# Patient Record
Sex: MALE | Race: WHITE | Employment: FULL TIME | URBAN - METROPOLITAN AREA
[De-identification: names, ages, dates, MRNs, and addresses within clinical notes are randomized per-mention and may not be internally consistent; named-entity substitution may affect disease eponyms.]

---

## 2019-09-18 ENCOUNTER — OFFICE VISIT (OUTPATIENT)
Dept: FAMILY MEDICINE CLINIC | Age: 54
End: 2019-09-18
Payer: COMMERCIAL

## 2019-09-18 VITALS
DIASTOLIC BLOOD PRESSURE: 100 MMHG | WEIGHT: 194.4 LBS | HEART RATE: 86 BPM | OXYGEN SATURATION: 98 % | TEMPERATURE: 98.9 F | SYSTOLIC BLOOD PRESSURE: 170 MMHG

## 2019-09-18 DIAGNOSIS — I10 ESSENTIAL HYPERTENSION: Primary | ICD-10-CM

## 2019-09-18 DIAGNOSIS — Z13.29 SCREENING FOR THYROID DISORDER: ICD-10-CM

## 2019-09-18 DIAGNOSIS — Z13.0 SCREENING, ANEMIA, DEFICIENCY, IRON: ICD-10-CM

## 2019-09-18 PROCEDURE — 99204 OFFICE O/P NEW MOD 45 MIN: CPT | Performed by: NURSE PRACTITIONER

## 2019-09-18 RX ORDER — LISINOPRIL 5 MG/1
5 TABLET ORAL DAILY
Qty: 30 TABLET | Refills: 1 | Status: SHIPPED | OUTPATIENT
Start: 2019-09-18 | End: 2019-10-22 | Stop reason: ALTCHOICE

## 2019-09-18 SDOH — HEALTH STABILITY: MENTAL HEALTH: HOW OFTEN DO YOU HAVE A DRINK CONTAINING ALCOHOL?: MONTHLY OR LESS

## 2019-09-18 ASSESSMENT — ENCOUNTER SYMPTOMS
ABDOMINAL PAIN: 0
EYES NEGATIVE: 1
CONSTIPATION: 0
WHEEZING: 0
RHINORRHEA: 0
RECTAL PAIN: 0
DIARRHEA: 0
BACK PAIN: 0
SORE THROAT: 0
SHORTNESS OF BREATH: 0
COUGH: 0
ABDOMINAL DISTENTION: 0

## 2019-09-18 ASSESSMENT — PATIENT HEALTH QUESTIONNAIRE - PHQ9
SUM OF ALL RESPONSES TO PHQ9 QUESTIONS 1 & 2: 0
SUM OF ALL RESPONSES TO PHQ QUESTIONS 1-9: 0
1. LITTLE INTEREST OR PLEASURE IN DOING THINGS: 0
2. FEELING DOWN, DEPRESSED OR HOPELESS: 0
SUM OF ALL RESPONSES TO PHQ QUESTIONS 1-9: 0

## 2019-09-18 NOTE — PATIENT INSTRUCTIONS
SURVEY:    You may be receiving a survey from [employer] regarding your visit today. Please complete the survey to enable us to provide the highest quality of care to you and your family. If you cannot score us as very good on any question, please call the office to discuss how we could have made your experience exceptional.     Thank you. Patient Education        High Blood Pressure: Care Instructions  Overview    It's normal for blood pressure to go up and down throughout the day. But if it stays up, you have high blood pressure. Another name for high blood pressure is hypertension. Despite what a lot of people think, high blood pressure usually doesn't cause headaches or make you feel dizzy or lightheaded. It usually has no symptoms. But it does increase your risk of stroke, heart attack, and other problems. You and your doctor will talk about your risks of these problems based on your blood pressure. Your doctor will give you a goal for your blood pressure. Your goal will be based on your health and your age. Lifestyle changes, such as eating healthy and being active, are always important to help lower blood pressure. You might also take medicine to reach your blood pressure goal.  Follow-up care is a key part of your treatment and safety. Be sure to make and go to all appointments, and call your doctor if you are having problems. It's also a good idea to know your test results and keep a list of the medicines you take. How can you care for yourself at home? Medical treatment  · If you stop taking your medicine, your blood pressure will go back up. You may take one or more types of medicine to lower your blood pressure. Be safe with medicines. Take your medicine exactly as prescribed. Call your doctor if you think you are having a problem with your medicine. · Talk to your doctor before you start taking aspirin every day.  Aspirin can help certain people lower their risk of a heart attack or

## 2019-09-19 ENCOUNTER — HOSPITAL ENCOUNTER (OUTPATIENT)
Age: 54
Discharge: HOME | End: 2019-09-19
Payer: COMMERCIAL

## 2019-09-19 DIAGNOSIS — I10 ESSENTIAL HYPERTENSION: ICD-10-CM

## 2019-09-19 DIAGNOSIS — Z13.29 SCREENING FOR THYROID DISORDER: ICD-10-CM

## 2019-09-19 DIAGNOSIS — Z13.0 SCREENING, ANEMIA, DEFICIENCY, IRON: ICD-10-CM

## 2019-09-19 LAB
ABSOLUTE EOS #: 0.1 K/UL (ref 0–0.4)
ABSOLUTE IMMATURE GRANULOCYTE: NORMAL K/UL (ref 0–0.3)
ABSOLUTE LYMPH #: 1.6 K/UL (ref 1–4.8)
ABSOLUTE MONO #: 0.3 K/UL (ref 0–1)
ANION GAP SERPL CALCULATED.3IONS-SCNC: 11 MMOL/L (ref 9–17)
BASOPHILS # BLD: 0 % (ref 0–2)
BASOPHILS ABSOLUTE: 0 K/UL (ref 0–0.2)
BUN BLDV-MCNC: 21 MG/DL (ref 6–20)
BUN/CREAT BLD: 19 (ref 9–20)
CALCIUM SERPL-MCNC: 9.7 MG/DL (ref 8.6–10.4)
CHLORIDE BLD-SCNC: 102 MMOL/L (ref 98–107)
CO2: 26 MMOL/L (ref 20–31)
CREAT SERPL-MCNC: 1.11 MG/DL (ref 0.7–1.2)
DIFFERENTIAL TYPE: YES
EOSINOPHILS RELATIVE PERCENT: 1 % (ref 0–5)
GFR AFRICAN AMERICAN: >60 ML/MIN
GFR NON-AFRICAN AMERICAN: >60 ML/MIN
GFR SERPL CREATININE-BSD FRML MDRD: ABNORMAL ML/MIN/{1.73_M2}
GFR SERPL CREATININE-BSD FRML MDRD: ABNORMAL ML/MIN/{1.73_M2}
GLUCOSE BLD-MCNC: 102 MG/DL (ref 70–99)
HCT VFR BLD CALC: 44.5 % (ref 41–53)
HEMOGLOBIN: 15.1 G/DL (ref 13.5–17.5)
IMMATURE GRANULOCYTES: NORMAL %
LYMPHOCYTES # BLD: 27 % (ref 13–44)
MCH RBC QN AUTO: 31.4 PG (ref 26–34)
MCHC RBC AUTO-ENTMCNC: 33.8 G/DL (ref 31–37)
MCV RBC AUTO: 92.9 FL (ref 80–100)
MONOCYTES # BLD: 6 % (ref 5–9)
NRBC AUTOMATED: NORMAL PER 100 WBC
PDW BLD-RTO: 13.7 % (ref 12.1–15.2)
PLATELET # BLD: 151 K/UL (ref 140–450)
PLATELET ESTIMATE: NORMAL
PMV BLD AUTO: NORMAL FL (ref 6–12)
POTASSIUM SERPL-SCNC: 3.7 MMOL/L (ref 3.7–5.3)
RBC # BLD: 4.79 M/UL (ref 4.5–5.9)
RBC # BLD: NORMAL 10*6/UL
SEG NEUTROPHILS: 66 % (ref 39–75)
SEGMENTED NEUTROPHILS ABSOLUTE COUNT: 4.1 K/UL (ref 2.1–6.5)
SODIUM BLD-SCNC: 139 MMOL/L (ref 135–144)
TSH SERPL DL<=0.05 MIU/L-ACNC: 1.74 MIU/L (ref 0.3–5)
WBC # BLD: 6.2 K/UL (ref 3.5–11)
WBC # BLD: NORMAL 10*3/UL

## 2019-09-19 PROCEDURE — 80048 BASIC METABOLIC PNL TOTAL CA: CPT

## 2019-09-19 PROCEDURE — 84443 ASSAY THYROID STIM HORMONE: CPT

## 2019-09-19 PROCEDURE — 85025 COMPLETE CBC W/AUTO DIFF WBC: CPT

## 2019-09-19 PROCEDURE — 36415 COLL VENOUS BLD VENIPUNCTURE: CPT

## 2019-09-19 PROCEDURE — 93005 ELECTROCARDIOGRAM TRACING: CPT

## 2019-09-20 LAB
EKG ATRIAL RATE: 80 BPM
EKG P AXIS: 9 DEGREES
EKG P-R INTERVAL: 142 MS
EKG Q-T INTERVAL: 404 MS
EKG QRS DURATION: 110 MS
EKG QTC CALCULATION (BAZETT): 465 MS
EKG R AXIS: 21 DEGREES
EKG T AXIS: 37 DEGREES
EKG VENTRICULAR RATE: 80 BPM

## 2019-09-20 PROCEDURE — 93010 ELECTROCARDIOGRAM REPORT: CPT | Performed by: INTERNAL MEDICINE

## 2019-10-22 ENCOUNTER — TELEPHONE (OUTPATIENT)
Dept: PRIMARY CARE CLINIC | Age: 54
End: 2019-10-22

## 2019-10-22 ENCOUNTER — OFFICE VISIT (OUTPATIENT)
Dept: PRIMARY CARE CLINIC | Age: 54
End: 2019-10-22
Payer: COMMERCIAL

## 2019-10-22 VITALS
HEIGHT: 72 IN | BODY MASS INDEX: 26.01 KG/M2 | SYSTOLIC BLOOD PRESSURE: 142 MMHG | WEIGHT: 192 LBS | OXYGEN SATURATION: 97 % | DIASTOLIC BLOOD PRESSURE: 88 MMHG | HEART RATE: 84 BPM | TEMPERATURE: 98.3 F

## 2019-10-22 DIAGNOSIS — Z12.5 SCREENING FOR PROSTATE CANCER: ICD-10-CM

## 2019-10-22 DIAGNOSIS — Z13.220 SCREENING CHOLESTEROL LEVEL: ICD-10-CM

## 2019-10-22 DIAGNOSIS — Z12.11 ENCOUNTER FOR SCREENING COLONOSCOPY: ICD-10-CM

## 2019-10-22 DIAGNOSIS — Z00.00 ANNUAL PHYSICAL EXAM: Primary | ICD-10-CM

## 2019-10-22 DIAGNOSIS — Z12.11 COLON CANCER SCREENING: Primary | ICD-10-CM

## 2019-10-22 DIAGNOSIS — I10 ESSENTIAL HYPERTENSION: ICD-10-CM

## 2019-10-22 PROCEDURE — 99396 PREV VISIT EST AGE 40-64: CPT | Performed by: NURSE PRACTITIONER

## 2019-10-22 RX ORDER — AMLODIPINE BESYLATE 5 MG/1
5 TABLET ORAL DAILY
Qty: 30 TABLET | Refills: 1 | Status: SHIPPED | OUTPATIENT
Start: 2019-10-22 | End: 2019-11-19 | Stop reason: SDUPTHER

## 2019-10-22 ASSESSMENT — ENCOUNTER SYMPTOMS
CONSTIPATION: 0
EYES NEGATIVE: 1
ABDOMINAL DISTENTION: 0
COUGH: 0
DIARRHEA: 0
SINUS PAIN: 0

## 2019-10-23 RX ORDER — SODIUM, POTASSIUM,MAG SULFATES 17.5-3.13G
SOLUTION, RECONSTITUTED, ORAL ORAL
Qty: 2 BOTTLE | Refills: 0 | Status: SHIPPED | OUTPATIENT
Start: 2019-10-23 | End: 2020-10-21 | Stop reason: ALTCHOICE

## 2019-11-06 ENCOUNTER — TELEPHONE (OUTPATIENT)
Dept: PRIMARY CARE CLINIC | Age: 54
End: 2019-11-06

## 2019-12-05 ENCOUNTER — HOSPITAL ENCOUNTER (OUTPATIENT)
Age: 54
Discharge: HOME | End: 2019-12-05
Payer: COMMERCIAL

## 2019-12-05 DIAGNOSIS — I10 ESSENTIAL HYPERTENSION: ICD-10-CM

## 2019-12-05 DIAGNOSIS — Z13.220 SCREENING CHOLESTEROL LEVEL: ICD-10-CM

## 2019-12-05 DIAGNOSIS — Z12.5 SCREENING FOR PROSTATE CANCER: ICD-10-CM

## 2019-12-05 DIAGNOSIS — Z00.00 ANNUAL PHYSICAL EXAM: ICD-10-CM

## 2019-12-05 LAB
ALBUMIN SERPL-MCNC: 4.6 G/DL (ref 3.5–5.2)
ALBUMIN/GLOBULIN RATIO: ABNORMAL (ref 1–2.5)
ALP BLD-CCNC: 86 U/L (ref 40–129)
ALT SERPL-CCNC: 30 U/L (ref 5–41)
ANION GAP SERPL CALCULATED.3IONS-SCNC: 11 MMOL/L (ref 9–17)
AST SERPL-CCNC: 21 U/L
BILIRUB SERPL-MCNC: 1.05 MG/DL (ref 0.3–1.2)
BUN BLDV-MCNC: 21 MG/DL (ref 6–20)
BUN/CREAT BLD: 18 (ref 9–20)
CALCIUM SERPL-MCNC: 10.1 MG/DL (ref 8.6–10.4)
CHLORIDE BLD-SCNC: 104 MMOL/L (ref 98–107)
CHOLESTEROL/HDL RATIO: 4
CHOLESTEROL: 253 MG/DL
CO2: 26 MMOL/L (ref 20–31)
CREAT SERPL-MCNC: 1.16 MG/DL (ref 0.7–1.2)
GFR AFRICAN AMERICAN: >60 ML/MIN
GFR NON-AFRICAN AMERICAN: >60 ML/MIN
GFR SERPL CREATININE-BSD FRML MDRD: ABNORMAL ML/MIN/{1.73_M2}
GFR SERPL CREATININE-BSD FRML MDRD: ABNORMAL ML/MIN/{1.73_M2}
GLUCOSE BLD-MCNC: 113 MG/DL (ref 70–99)
HDLC SERPL-MCNC: 63 MG/DL
LDL CHOLESTEROL: 167 MG/DL (ref 0–130)
PATIENT FASTING?: YES
POTASSIUM SERPL-SCNC: 4.2 MMOL/L (ref 3.7–5.3)
PROSTATE SPECIFIC ANTIGEN: 1.63 UG/L
SODIUM BLD-SCNC: 141 MMOL/L (ref 135–144)
TOTAL PROTEIN: 7.8 G/DL (ref 6.4–8.3)
TRIGL SERPL-MCNC: 116 MG/DL
VLDLC SERPL CALC-MCNC: ABNORMAL MG/DL (ref 1–30)

## 2019-12-05 PROCEDURE — 80053 COMPREHEN METABOLIC PANEL: CPT

## 2019-12-05 PROCEDURE — 80061 LIPID PANEL: CPT

## 2019-12-05 PROCEDURE — G0103 PSA SCREENING: HCPCS

## 2019-12-05 PROCEDURE — 36415 COLL VENOUS BLD VENIPUNCTURE: CPT

## 2019-12-06 RX ORDER — ROSUVASTATIN CALCIUM 20 MG/1
20 TABLET, COATED ORAL NIGHTLY
Qty: 90 TABLET | Refills: 0 | Status: SHIPPED | OUTPATIENT
Start: 2019-12-06 | End: 2020-01-08 | Stop reason: SDUPTHER

## 2019-12-24 RX ORDER — AMLODIPINE BESYLATE 5 MG/1
5 TABLET ORAL DAILY
Qty: 90 TABLET | Refills: 2 | Status: SHIPPED | OUTPATIENT
Start: 2019-12-24 | End: 2020-08-28

## 2020-01-07 ENCOUNTER — TELEPHONE (OUTPATIENT)
Dept: FAMILY MEDICINE CLINIC | Age: 55
End: 2020-01-07

## 2020-01-07 DIAGNOSIS — E78.00 HYPERCHOLESTEROLEMIA: Primary | ICD-10-CM

## 2020-01-07 RX ORDER — ROSUVASTATIN CALCIUM 20 MG/1
20 TABLET, COATED ORAL NIGHTLY
Qty: 90 TABLET | Refills: 0 | Status: CANCELLED | OUTPATIENT
Start: 2020-01-07

## 2020-01-08 RX ORDER — ROSUVASTATIN CALCIUM 20 MG/1
20 TABLET, COATED ORAL NIGHTLY
Qty: 90 TABLET | Refills: 0 | Status: SHIPPED | OUTPATIENT
Start: 2020-01-08 | End: 2020-05-05

## 2020-08-28 RX ORDER — AMLODIPINE BESYLATE 5 MG/1
TABLET ORAL
Qty: 90 TABLET | Refills: 0 | Status: SHIPPED | OUTPATIENT
Start: 2020-08-28 | End: 2020-10-21 | Stop reason: SDUPTHER

## 2020-10-21 ENCOUNTER — OFFICE VISIT (OUTPATIENT)
Dept: PRIMARY CARE CLINIC | Age: 55
End: 2020-10-21
Payer: COMMERCIAL

## 2020-10-21 VITALS — WEIGHT: 200 LBS | TEMPERATURE: 98.2 F | OXYGEN SATURATION: 98 % | BODY MASS INDEX: 27.12 KG/M2 | HEART RATE: 93 BPM

## 2020-10-21 PROCEDURE — 99214 OFFICE O/P EST MOD 30 MIN: CPT | Performed by: NURSE PRACTITIONER

## 2020-10-21 RX ORDER — AMLODIPINE BESYLATE 5 MG/1
TABLET ORAL
Qty: 90 TABLET | Refills: 1 | Status: SHIPPED | OUTPATIENT
Start: 2020-10-21 | End: 2021-03-17 | Stop reason: SDUPTHER

## 2020-10-21 RX ORDER — ROSUVASTATIN CALCIUM 20 MG/1
20 TABLET, COATED ORAL NIGHTLY
Qty: 90 TABLET | Refills: 3 | Status: SHIPPED | OUTPATIENT
Start: 2020-10-21 | End: 2021-03-17 | Stop reason: SDUPTHER

## 2020-10-21 ASSESSMENT — ENCOUNTER SYMPTOMS
EYES NEGATIVE: 1
ABDOMINAL DISTENTION: 0
CHEST TIGHTNESS: 0

## 2020-10-21 ASSESSMENT — PATIENT HEALTH QUESTIONNAIRE - PHQ9
SUM OF ALL RESPONSES TO PHQ QUESTIONS 1-9: 0
SUM OF ALL RESPONSES TO PHQ QUESTIONS 1-9: 0
2. FEELING DOWN, DEPRESSED OR HOPELESS: 0
1. LITTLE INTEREST OR PLEASURE IN DOING THINGS: 0
SUM OF ALL RESPONSES TO PHQ9 QUESTIONS 1 & 2: 0
SUM OF ALL RESPONSES TO PHQ QUESTIONS 1-9: 0

## 2020-10-21 NOTE — PATIENT INSTRUCTIONS
You may be receiving a survey from [employer] regarding your visit today. You may get this in the mail, through your MyChart or in your email. Please complete the survey to enable us to provide the highest quality of care to you and your family. If you cannot score us as very good ( 5 Stars) on any question, please feel free to call the office to discuss how we could have made your experience exceptional.     Thank You! Dejah Monteiro, JORGE LUIS-CNP  [address] [place], [place], [place]    Tdap (tetanus) vaccine needs to be updated.

## 2020-10-21 NOTE — PROGRESS NOTES
10/21/2020     Nicolasa Driscoll (:  1965) is a 54 y.o. male, here for evaluation of the following medical concerns:    HPI  Hypertension:  Home blood pressure monitoring: Yes - weekly. He is not adherent to a low sodium diet. Patient denies chest pain, shortness of breath, headache, lightheadedness, blurred vision, peripheral edema and palpitations. Antihypertensive medication side effects: no medication side effects noted. Use of agents associated with hypertension: none. Sodium (mmol/L)   Date Value   2019 141    BUN (mg/dL)   Date Value   2019 21 (H)    Glucose (mg/dL)   Date Value   2019 113 (H)      Potassium (mmol/L)   Date Value   2019 4.2    CREATININE (mg/dL)   Date Value   2019 1.16         Treatment Adherence:   Medication compliance:  compliant all of the time  Diet compliance:  compliant all of the time  Weight trend: stable  Current exercise: no regular exercise  Barriers: none    Wt Readings from Last 3 Encounters:   10/21/20 200 lb (90.7 kg)   10/22/19 192 lb (87.1 kg)   19 194 lb 6.4 oz (88.2 kg)                                           Sodium (mmol/L)   Date Value   2019 141    BUN (mg/dL)   Date Value   2019 21 (H)    Glucose (mg/dL)   Date Value   2019 113 (H)      Potassium (mmol/L)   Date Value   2019 4.2    CREATININE (mg/dL)   Date Value   2019 1.16         Hyperlipidemia:  No new myalgias or GI upset on rosuvastatin (Crestor). Lab Results   Component Value Date    CHOL 253 (H) 2019    TRIG 116 2019    HDL 63 2019     Lab Results   Component Value Date    ALT 30 2019    AST 21 2019          Review of Systems   Constitutional: Negative for activity change, appetite change, chills and fever. HENT: Negative for congestion. Eyes: Negative. Respiratory: Negative for chest tightness.     Cardiovascular: Negative for chest pain and leg swelling. Gastrointestinal: Negative for abdominal distention. Genitourinary: Negative for difficulty urinating. Musculoskeletal: Negative for arthralgias. Neurological: Negative for dizziness and headaches. Psychiatric/Behavioral: Negative for agitation. Prior to Visit Medications    Medication Sig Taking? Authorizing Provider   amLODIPine (NORVASC) 5 MG tablet TAKE 1 TABLET BY MOUTH EVERY DAY Yes JORGE LUIS Engle CNP   rosuvastatin (CRESTOR) 20 MG tablet Take 1 tablet by mouth nightly Yes JORGE LUIS Engle CNP        Social History     Tobacco Use    Smoking status: Never Smoker    Smokeless tobacco: Never Used   Substance Use Topics    Alcohol use: Yes     Frequency: Monthly or less        Vitals:    10/21/20 1618   Pulse: 93   Temp: 98.2 °F (36.8 °C)   TempSrc: Infrared   SpO2: 98%   Weight: 200 lb (90.7 kg)     Estimated body mass index is 27.12 kg/m² as calculated from the following:    Height as of 10/22/19: 6' (1.829 m). Weight as of this encounter: 200 lb (90.7 kg). Physical Exam  Vitals signs and nursing note reviewed. Constitutional:       General: He is not in acute distress. Appearance: Normal appearance. He is well-developed. HENT:      Head: Normocephalic and atraumatic. Right Ear: Tympanic membrane normal.      Left Ear: Tympanic membrane and external ear normal.      Nose: No congestion. Mouth/Throat:      Mouth: Mucous membranes are moist.      Pharynx: No oropharyngeal exudate. Eyes:      Pupils: Pupils are equal, round, and reactive to light. Neck:      Musculoskeletal: Normal range of motion and neck supple. Vascular: No carotid bruit (neg claudette). Cardiovascular:      Rate and Rhythm: Normal rate and regular rhythm. Pulses: Normal pulses. Heart sounds: Normal heart sounds. No murmur. Pulmonary:      Effort: Pulmonary effort is normal. No respiratory distress. Breath sounds: Normal breath sounds.    Abdominal: Palpations: Abdomen is soft. There is no mass. Tenderness: There is no abdominal tenderness. Musculoskeletal: Normal range of motion. Right lower leg: No edema. Left lower leg: No edema. Lymphadenopathy:      Cervical: No cervical adenopathy. Skin:     Findings: No rash. Neurological:      Mental Status: He is alert and oriented to person, place, and time. Psychiatric:         Behavior: Behavior normal.         Thought Content: Thought content normal.         Judgment: Judgment normal.         ASSESSMENT/PLAN:  1. Essential hypertension  Stable and controlled  Continue CCB  Reduce salt in diet  Avoid pop  - amLODIPine (NORVASC) 5 MG tablet; TAKE 1 TABLET BY MOUTH EVERY DAY  Dispense: 90 tablet; Refill: 1    2. Hypercholesterolemia  Continue statin  Lab Results   Component Value Date    LDLCHOLESTEROL 167 (H) 12/05/2019       - Lipid Panel; Future  - Comprehensive Metabolic Panel; Future    3. Prostate cancer screening    - Psa screening; Future      Return in about 6 months (around 4/21/2021) for HTN check. An electronic signature was used to authenticate this note.     --JORGE LUIS Causey - CNP on 10/22/2020 at 7:40 PM

## 2020-10-24 ENCOUNTER — HOSPITAL ENCOUNTER (OUTPATIENT)
Age: 55
Discharge: HOME | End: 2020-10-24
Payer: COMMERCIAL

## 2020-10-24 LAB
ALBUMIN SERPL-MCNC: 4.6 G/DL (ref 3.5–5.2)
ALBUMIN/GLOBULIN RATIO: ABNORMAL (ref 1–2.5)
ALP BLD-CCNC: 75 U/L (ref 40–129)
ALT SERPL-CCNC: 24 U/L (ref 5–41)
ANION GAP SERPL CALCULATED.3IONS-SCNC: 9 MMOL/L (ref 9–17)
AST SERPL-CCNC: 23 U/L
BILIRUB SERPL-MCNC: 0.82 MG/DL (ref 0.3–1.2)
BUN BLDV-MCNC: 21 MG/DL (ref 6–20)
BUN/CREAT BLD: 20 (ref 9–20)
CALCIUM SERPL-MCNC: 9.2 MG/DL (ref 8.6–10.4)
CHLORIDE BLD-SCNC: 105 MMOL/L (ref 98–107)
CHOLESTEROL/HDL RATIO: 2.3
CHOLESTEROL: 126 MG/DL
CO2: 28 MMOL/L (ref 20–31)
CREAT SERPL-MCNC: 1.05 MG/DL (ref 0.7–1.2)
GFR AFRICAN AMERICAN: >60 ML/MIN
GFR NON-AFRICAN AMERICAN: >60 ML/MIN
GFR SERPL CREATININE-BSD FRML MDRD: ABNORMAL ML/MIN/{1.73_M2}
GFR SERPL CREATININE-BSD FRML MDRD: ABNORMAL ML/MIN/{1.73_M2}
GLUCOSE BLD-MCNC: 122 MG/DL (ref 70–99)
HDLC SERPL-MCNC: 54 MG/DL
LDL CHOLESTEROL: 58 MG/DL (ref 0–130)
PATIENT FASTING?: YES
POTASSIUM SERPL-SCNC: 3.8 MMOL/L (ref 3.7–5.3)
PROSTATE SPECIFIC ANTIGEN: 0.64 UG/L
SODIUM BLD-SCNC: 142 MMOL/L (ref 135–144)
TOTAL PROTEIN: 7.3 G/DL (ref 6.4–8.3)
TRIGL SERPL-MCNC: 68 MG/DL
VLDLC SERPL CALC-MCNC: NORMAL MG/DL (ref 1–30)

## 2020-10-24 PROCEDURE — 36415 COLL VENOUS BLD VENIPUNCTURE: CPT

## 2020-10-24 PROCEDURE — 80061 LIPID PANEL: CPT

## 2020-10-24 PROCEDURE — 80053 COMPREHEN METABOLIC PANEL: CPT

## 2020-10-24 PROCEDURE — G0103 PSA SCREENING: HCPCS

## 2021-03-17 DIAGNOSIS — I10 ESSENTIAL HYPERTENSION: ICD-10-CM

## 2021-03-17 RX ORDER — ROSUVASTATIN CALCIUM 20 MG/1
20 TABLET, COATED ORAL NIGHTLY
Qty: 90 TABLET | Refills: 1 | Status: SHIPPED | OUTPATIENT
Start: 2021-03-17 | End: 2021-10-08 | Stop reason: SDUPTHER

## 2021-03-17 RX ORDER — AMLODIPINE BESYLATE 5 MG/1
TABLET ORAL
Qty: 90 TABLET | Refills: 1 | Status: SHIPPED | OUTPATIENT
Start: 2021-03-17 | End: 2021-10-08 | Stop reason: SDUPTHER

## 2021-09-01 ENCOUNTER — TELEPHONE (OUTPATIENT)
Dept: PRIMARY CARE CLINIC | Age: 56
End: 2021-09-01

## 2021-09-01 NOTE — TELEPHONE ENCOUNTER
----- Message from April Shane sent at 9/1/2021  2:18 PM EDT -----  Subject: Appointment Request    Reason for Call: Routine Physical Exam    QUESTIONS  Type of Appointment? Established Patient  Reason for appointment request? Available appointments did not meet   patient need  Additional Information for Provider? patient received call on 8/25 to   switch appointment time for his 10/22 appointment. no available   appointments were found on this date, attempted to reach out to office x2   if they meant for him to switch appointment date entirely or just the   time. please call patient back on his cell phone to reschedule   ---------------------------------------------------------------------------  --------------  [employer]  What is the best way for the office to contact you? OK to leave message on   voicemail  Preferred Call Back Phone Number? [phone]  ---------------------------------------------------------------------------  --------------  SCRIPT ANSWERS  Relationship to Patient? Self  (If the patient has Medicare as their primary insurance coverage ask this   question) Are you requesting a Medicare Annual Wellness Visit? No  (Is the patient requesting a pap smear with their physical exam?)? No  (Is the patient requesting their annual physical and does not need PAP or   AWV per above?)? Yes   Have you been diagnosed with, awaiting test results for, or told that you   are suspected of having COVID-19 (Coronavirus)? (If patient has tested   negative or was tested as a requirement for work, school, or travel and   not based on symptoms, answer no)? No  Do you currently have flu-like symptoms including fever or chills, cough,   shortness of breath, difficulty breathing, or new loss of taste or smell? No  Have you had close contact with someone with COVID-19 in the last 14 days? No  (Service Expert  click yes below to proceed with [employer]   Scheduling)?  Yes

## 2021-09-27 ENCOUNTER — HOSPITAL ENCOUNTER (EMERGENCY)
Age: 56
Discharge: HOME | End: 2021-09-27
Attending: EMERGENCY MEDICINE
Payer: COMMERCIAL

## 2021-09-27 VITALS
HEIGHT: 72 IN | HEART RATE: 98 BPM | BODY MASS INDEX: 25.6 KG/M2 | TEMPERATURE: 98 F | OXYGEN SATURATION: 99 % | DIASTOLIC BLOOD PRESSURE: 91 MMHG | WEIGHT: 189 LBS | RESPIRATION RATE: 16 BRPM | SYSTOLIC BLOOD PRESSURE: 122 MMHG

## 2021-09-27 DIAGNOSIS — U07.1 COVID-19: Primary | ICD-10-CM

## 2021-09-27 DIAGNOSIS — L50.9 URTICARIA: ICD-10-CM

## 2021-09-27 LAB
ABSOLUTE EOS #: 0 K/UL (ref 0–0.4)
ABSOLUTE IMMATURE GRANULOCYTE: ABNORMAL K/UL (ref 0–0.3)
ABSOLUTE LYMPH #: 1.1 K/UL (ref 1–4.8)
ABSOLUTE MONO #: 0.2 K/UL (ref 0–1)
ALBUMIN SERPL-MCNC: 4.1 G/DL (ref 3.5–5.2)
ALBUMIN/GLOBULIN RATIO: ABNORMAL (ref 1–2.5)
ALP BLD-CCNC: 84 U/L (ref 40–129)
ALT SERPL-CCNC: 24 U/L (ref 5–41)
ANION GAP SERPL CALCULATED.3IONS-SCNC: 10 MMOL/L (ref 9–17)
AST SERPL-CCNC: 23 U/L
BASOPHILS # BLD: 1 % (ref 0–2)
BASOPHILS ABSOLUTE: 0 K/UL (ref 0–0.2)
BILIRUB SERPL-MCNC: 0.42 MG/DL (ref 0.3–1.2)
BUN BLDV-MCNC: 22 MG/DL (ref 6–20)
BUN/CREAT BLD: 21 (ref 9–20)
CALCIUM SERPL-MCNC: 8.6 MG/DL (ref 8.6–10.4)
CHLORIDE BLD-SCNC: 104 MMOL/L (ref 98–107)
CO2: 26 MMOL/L (ref 20–31)
CREAT SERPL-MCNC: 1.07 MG/DL (ref 0.7–1.2)
DIFFERENTIAL TYPE: YES
EKG ATRIAL RATE: 94 BPM
EKG P AXIS: 43 DEGREES
EKG P-R INTERVAL: 140 MS
EKG Q-T INTERVAL: 392 MS
EKG QRS DURATION: 108 MS
EKG QTC CALCULATION (BAZETT): 490 MS
EKG R AXIS: 49 DEGREES
EKG T AXIS: 19 DEGREES
EKG VENTRICULAR RATE: 94 BPM
EOSINOPHILS RELATIVE PERCENT: 0 % (ref 0–5)
GFR AFRICAN AMERICAN: >60 ML/MIN
GFR NON-AFRICAN AMERICAN: >60 ML/MIN
GFR SERPL CREATININE-BSD FRML MDRD: ABNORMAL ML/MIN/{1.73_M2}
GFR SERPL CREATININE-BSD FRML MDRD: ABNORMAL ML/MIN/{1.73_M2}
GLUCOSE BLD-MCNC: 147 MG/DL (ref 70–99)
HCT VFR BLD CALC: 46.3 % (ref 41–53)
HEMOGLOBIN: 15.8 G/DL (ref 13.5–17.5)
IMMATURE GRANULOCYTES: ABNORMAL %
LYMPHOCYTES # BLD: 20 % (ref 13–44)
MCH RBC QN AUTO: 31.1 PG (ref 26–34)
MCHC RBC AUTO-ENTMCNC: 34.2 G/DL (ref 31–37)
MCV RBC AUTO: 91 FL (ref 80–100)
MONOCYTES # BLD: 4 % (ref 5–9)
NRBC AUTOMATED: ABNORMAL PER 100 WBC
PDW BLD-RTO: 13.4 % (ref 12.1–15.2)
PLATELET # BLD: 112 K/UL (ref 140–450)
PLATELET ESTIMATE: ABNORMAL
PMV BLD AUTO: ABNORMAL FL (ref 6–12)
POTASSIUM SERPL-SCNC: 3.6 MMOL/L (ref 3.7–5.3)
RBC # BLD: 5.08 M/UL (ref 4.5–5.9)
RBC # BLD: ABNORMAL 10*6/UL
SARS-COV-2, RAPID: DETECTED
SEG NEUTROPHILS: 75 % (ref 39–75)
SEGMENTED NEUTROPHILS ABSOLUTE COUNT: 4 K/UL (ref 2.1–6.5)
SODIUM BLD-SCNC: 140 MMOL/L (ref 135–144)
SPECIMEN DESCRIPTION: ABNORMAL
TOTAL PROTEIN: 6.9 G/DL (ref 6.4–8.3)
TROPONIN INTERP: NORMAL
TROPONIN T: NORMAL NG/ML
TROPONIN, HIGH SENSITIVITY: 7 NG/L (ref 0–22)
WBC # BLD: 5.4 K/UL (ref 3.5–11)
WBC # BLD: ABNORMAL 10*3/UL

## 2021-09-27 PROCEDURE — 36415 COLL VENOUS BLD VENIPUNCTURE: CPT

## 2021-09-27 PROCEDURE — 85025 COMPLETE CBC W/AUTO DIFF WBC: CPT

## 2021-09-27 PROCEDURE — 96375 TX/PRO/DX INJ NEW DRUG ADDON: CPT

## 2021-09-27 PROCEDURE — 84484 ASSAY OF TROPONIN QUANT: CPT

## 2021-09-27 PROCEDURE — 6360000002 HC RX W HCPCS: Performed by: EMERGENCY MEDICINE

## 2021-09-27 PROCEDURE — 80053 COMPREHEN METABOLIC PANEL: CPT

## 2021-09-27 PROCEDURE — 2500000003 HC RX 250 WO HCPCS: Performed by: EMERGENCY MEDICINE

## 2021-09-27 PROCEDURE — 93010 ELECTROCARDIOGRAM REPORT: CPT | Performed by: INTERNAL MEDICINE

## 2021-09-27 PROCEDURE — 2580000003 HC RX 258: Performed by: EMERGENCY MEDICINE

## 2021-09-27 PROCEDURE — 96374 THER/PROPH/DIAG INJ IV PUSH: CPT

## 2021-09-27 PROCEDURE — 87635 SARS-COV-2 COVID-19 AMP PRB: CPT

## 2021-09-27 PROCEDURE — 99284 EMERGENCY DEPT VISIT MOD MDM: CPT

## 2021-09-27 PROCEDURE — 93005 ELECTROCARDIOGRAM TRACING: CPT | Performed by: EMERGENCY MEDICINE

## 2021-09-27 RX ORDER — 0.9 % SODIUM CHLORIDE 0.9 %
1000 INTRAVENOUS SOLUTION INTRAVENOUS ONCE
Status: COMPLETED | OUTPATIENT
Start: 2021-09-27 | End: 2021-09-27

## 2021-09-27 RX ORDER — DIPHENHYDRAMINE HYDROCHLORIDE 50 MG/ML
25 INJECTION INTRAMUSCULAR; INTRAVENOUS ONCE
Status: COMPLETED | OUTPATIENT
Start: 2021-09-27 | End: 2021-09-27

## 2021-09-27 RX ORDER — METHYLPREDNISOLONE SODIUM SUCCINATE 125 MG/2ML
125 INJECTION, POWDER, LYOPHILIZED, FOR SOLUTION INTRAMUSCULAR; INTRAVENOUS ONCE
Status: COMPLETED | OUTPATIENT
Start: 2021-09-27 | End: 2021-09-27

## 2021-09-27 RX ORDER — DIPHENHYDRAMINE HCL 25 MG
25 CAPSULE ORAL EVERY 6 HOURS PRN
COMMUNITY
End: 2021-10-29 | Stop reason: ALTCHOICE

## 2021-09-27 RX ORDER — PREDNISONE 20 MG/1
40 TABLET ORAL DAILY
Qty: 10 TABLET | Refills: 0 | Status: SHIPPED | OUTPATIENT
Start: 2021-09-27 | End: 2021-10-02

## 2021-09-27 RX ADMIN — METHYLPREDNISOLONE SODIUM SUCCINATE 125 MG: 125 INJECTION, POWDER, FOR SOLUTION INTRAMUSCULAR; INTRAVENOUS at 04:49

## 2021-09-27 RX ADMIN — DIPHENHYDRAMINE HYDROCHLORIDE 25 MG: 50 INJECTION, SOLUTION INTRAMUSCULAR; INTRAVENOUS at 04:50

## 2021-09-27 RX ADMIN — FAMOTIDINE 20 MG: 10 INJECTION INTRAVENOUS at 04:52

## 2021-09-27 RX ADMIN — SODIUM CHLORIDE 1000 ML: 9 INJECTION, SOLUTION INTRAVENOUS at 04:48

## 2021-09-27 ASSESSMENT — ENCOUNTER SYMPTOMS
VOMITING: 0
TROUBLE SWALLOWING: 0
EYE PAIN: 0
NAUSEA: 0
COLOR CHANGE: 0
COUGH: 0
BACK PAIN: 0
DIARRHEA: 0
SHORTNESS OF BREATH: 0
EYE REDNESS: 0
SORE THROAT: 0
ABDOMINAL PAIN: 0

## 2021-09-27 NOTE — ED PROVIDER NOTES
[facility] ED  eMERGENCY dEPARTMENT eNCOUnter      Pt Name: Custer Bosworth  MRN: [id number]  [place] 1965  Date of evaluation: 9/27/2021  Provider: Mikael Zheng MD    [address] [place] [address]       Chief Complaint   Patient presents with    Urticaria     started Friday on the hips - got worse over the weekend and is now scattered over entire body and pt states is just not feeling right - startedTuesday with a cold but thought it was getting better      Patient is a 78-year-old male who presents to the emergency department with hives that are generalized. He states it started 2 days ago with just a small amount of hives and now it is diffuse. He states today he just felt weak all over. He denies any specific focal findings. He states that he did not have any chest pain or shortness of breath. He states that he has a mild headache. He denies any neck pain. He denies any numbness tingling or weakness. He denies any heaviness or pressure or tightness in his chest or abdomen or back. He denies any pain. He states he just felt weaker than normal and does not have his normal energy and took a nap today which is very unusual for him. He denies a cough. He denies shortness of breath right now. Nursing Notes were reviewed. REVIEW OF SYSTEMS    (2-9 systems for level 4, 10 or more for level 5)     Review of Systems   Constitutional: Positive for fatigue. Negative for chills and fever. HENT: Negative for ear pain, sore throat and trouble swallowing. Eyes: Negative for pain and redness. Respiratory: Negative for cough and shortness of breath. Cardiovascular: Negative for chest pain and palpitations. Gastrointestinal: Negative for abdominal pain, diarrhea, nausea and vomiting. Genitourinary: Negative for dysuria and frequency. Musculoskeletal: Negative for back pain and neck pain. Skin: Positive for rash. Negative for color change.    Neurological: Negative for dizziness, syncope and headaches. Psychiatric/Behavioral: Negative for hallucinations and suicidal ideas. Except as noted above the remainder of the review of systems was reviewed and negative. PAST MEDICAL HISTORY   History reviewed. No pertinent past medical history. SURGICAL HISTORY       Past Surgical History:   Procedure Laterality Date    CYST REMOVAL      neck     HERNIA REPAIR      1970, double hernia         ALLERGIES     Ampicillin    FAMILY HISTORY       Family History   Problem Relation Age of Onset    Coronary Art Dis Mother     Heart Failure Mother     Thyroid Disease Mother     Heart Disease Father     Heart Attack Father           SOCIAL HISTORY       Social History     Socioeconomic History    Marital status:      Spouse name: None    Number of children: None    Years of education: None    Highest education level: None   Occupational History    None   Tobacco Use    Smoking status: Light Tobacco Smoker     Types: Cigars    Smokeless tobacco: Never Used   Substance and Sexual Activity    Alcohol use: Yes     Comment: occas.  Drug use: Never    Sexual activity: None   Other Topics Concern    None   Social History Narrative    None     Social Determinants of Health     Financial Resource Strain:     Difficulty of Paying Living Expenses:    Food Insecurity:     Worried About Running Out of Food in the Last Year:     [address] in the Last Year:    Transportation Needs:     Lack of Transportation (Medical):      Lack of Transportation (Non-Medical):    Physical Activity:     Days of Exercise per Week:     Minutes of Exercise per Session:    Stress:     Feeling of Stress :    Social Connections:     Frequency of Communication with Friends and Family:     Frequency of Social Gatherings with Friends and Family:     Attends [employer] Services:     Active Member of Clubs or Organizations:     Attends Club or Organization Meetings:     Marital Status:    Intimate Partner Violence:     Fear of Current or Ex-Partner:     Emotionally Abused:     Physically Abused:     Sexually Abused:            PHYSICAL EXAM    (up to 7 for level 4, 8 ormore for level 5)     ED Triage Vitals [09/27/21 0426]   BP Temp Temp Source Pulse Resp SpO2 Height Weight   (!) 122/91 98 °F (36.7 °C) Oral 98 16 99 % 6' (1.829 m) 189 lb (85.7 kg)       Physical Exam     Physical    Vital signs and nursing notes were reviewed as well as the social, family, and past medical history. Gen. appearance: Patient is alert and oriented and in no acute distress    Head: Atraumatic, normocephalic    Neck: Supple, trachea/thyroid normal    EENT: PERRLA, EOMI, conjunctiva normal.    Skin: Diffuse urticarial rash noted    Cardiovascular: Heart RRR, no gallops or rubs, no aortic enlargement or bruits noted. Respiratory: Lungs clear, no wheezing, no rales, normal breath sounds. Gastrointestinal: Abdomen nontender, bowel sounds normal, no rebound/guarding/distention or mass    Musculoskeletal: No tenderness in the extremities, no back or hip pain.     Neurological: Patient is alert and oriented ×3, no focal motor or sensory deficits noted, reflexes normal, NIH = 0      DIAGNOSTIC RESULTS     EKG: All EKG's are interpreted by the Emergency Department Physicianwho either signs or Co-signs this chart in the absence of a cardiologist.        RADIOLOGY:         LABS:  Labs Reviewed   COVID-19, RAPID - Abnormal; Notable for the following components:       Result Value    SARS-CoV-2, Rapid DETECTED (*)     All other components within normal limits   CBC WITH AUTO DIFFERENTIAL - Abnormal; Notable for the following components:    Platelets 277 (*)     Monocytes 4 (*)     All other components within normal limits   COMPREHENSIVE METABOLIC PANEL - Abnormal; Notable for the following components:    Glucose 147 (*)     BUN 22 (*)     Bun/Cre Ratio 21 (*)     Potassium 3.6 (*)     All other components within normal limits

## 2021-09-28 ENCOUNTER — VIRTUAL VISIT (OUTPATIENT)
Dept: PRIMARY CARE CLINIC | Age: 56
End: 2021-09-28
Payer: COMMERCIAL

## 2021-09-28 ENCOUNTER — CARE COORDINATION (OUTPATIENT)
Dept: CARE COORDINATION | Age: 56
End: 2021-09-28

## 2021-09-28 DIAGNOSIS — U07.1 COVID-19: Primary | ICD-10-CM

## 2021-09-28 DIAGNOSIS — L50.9 HIVES: ICD-10-CM

## 2021-09-28 PROCEDURE — 99442 PR PHYS/QHP TELEPHONE EVALUATION 11-20 MIN: CPT | Performed by: NURSE PRACTITIONER

## 2021-09-28 RX ORDER — FAMOTIDINE 40 MG/1
40 TABLET, FILM COATED ORAL EVERY EVENING
Qty: 30 TABLET | Refills: 0 | Status: SHIPPED | OUTPATIENT
Start: 2021-09-28 | End: 2021-10-29 | Stop reason: ALTCHOICE

## 2021-09-28 SDOH — ECONOMIC STABILITY: FOOD INSECURITY: WITHIN THE PAST 12 MONTHS, YOU WORRIED THAT YOUR FOOD WOULD RUN OUT BEFORE YOU GOT MONEY TO BUY MORE.: NEVER TRUE

## 2021-09-28 SDOH — ECONOMIC STABILITY: FOOD INSECURITY: WITHIN THE PAST 12 MONTHS, THE FOOD YOU BOUGHT JUST DIDN'T LAST AND YOU DIDN'T HAVE MONEY TO GET MORE.: NEVER TRUE

## 2021-09-28 ASSESSMENT — SOCIAL DETERMINANTS OF HEALTH (SDOH): HOW HARD IS IT FOR YOU TO PAY FOR THE VERY BASICS LIKE FOOD, HOUSING, MEDICAL CARE, AND HEATING?: NOT HARD AT ALL

## 2021-09-28 ASSESSMENT — PATIENT HEALTH QUESTIONNAIRE - PHQ9
SUM OF ALL RESPONSES TO PHQ QUESTIONS 1-9: 0
SUM OF ALL RESPONSES TO PHQ QUESTIONS 1-9: 0
2. FEELING DOWN, DEPRESSED OR HOPELESS: 0
SUM OF ALL RESPONSES TO PHQ QUESTIONS 1-9: 0
1. LITTLE INTEREST OR PLEASURE IN DOING THINGS: 0
SUM OF ALL RESPONSES TO PHQ9 QUESTIONS 1 & 2: 0

## 2021-09-28 NOTE — PATIENT INSTRUCTIONS
You may be receiving a survey from [employer] regarding your visit today. You may get this in the mail, through your MyChart or in your email. Please complete the survey to enable us to provide the highest quality of care to you and your family. If you cannot score us as very good ( 5 Stars) on any question, please feel free to call the office to discuss how we could have made your experience exceptional.     Thank You! SANDI Coppola, LNY Mae    Phone: [phone]  Fax: [facility] Office Hours:  Monday: Terrie Martinez office location [phone] ([phone]) Offering additional late hours the first Monday of the month until 7 pm.   Tuesday: 8-5 Wednesday: 8-5 Thursday:  Additional hours offered 2 Thursdays a month. Please call to inquire those dates.    Fridays: 7:30-4:30

## 2021-09-28 NOTE — PROGRESS NOTES
Gloria Cazares is a 64 y.o. male evaluated via telephone on 9/28/2021. Consent:  He and/or health care decision maker is aware that that he may receive a bill for this telephone service, depending on his insurance coverage, and has provided verbal consent to proceed: Yes      Documentation:  I communicated with the patient and/or health care decision maker about hives, covid-19. Details of this discussion including any medical advice provided:     Covid-19 symptom checklist    Fever   No, felt warm felt from hives  Cough  no  Loss of smell/taste   diminished  headache   yes, on Sunday when going to hospital   Weakness   yes, on Saturday with onset. \"felt off hadn't slept well\"   Diarrhea   no  Muscle pain   no  Shortness of breath  no  Abdominal pain  no  Rash  yes, hives mainly lower trunk and lower legs  Sore throat   no  Vomiting  no  Red eye  no  Joint pain  no  Bruising/bleeding  no    Any travel in the last month? no    Have you been in contact with anyone expected/suspected to have or exposed to Covid-19   no    Have you practiced good social distancing, avoiding person-to-person interactions except by telecommunication, and maintained the homebound/stay at home advisement with the current Covid-19 pandemic in [place]?    yes -       Pt given option of monoclonal antibody infusion and wants to discuss with his wife prior to making decision. Medications encouraged during covid -19 viral infection are:     Tylenol or ibuprofen for pain/fever/body aches  Good hydration  Vitamin C 500 or 1000 mg daily (for immunity boost)  Vitamin D3 2000 international units daily (for muscles including heart)    Added pepcid due to hives/itching 40 mg once a day  Instructed can use zyrtec or benadryl also for itching. Pt had not filled prednisone yet got today and started. Cool environment encouraged.      1. COVID-19  Good hydration  Above vitamins  Rest  Monoclonal antibody infusion offered pt undecided discussing with wife. Risks include: age 64 and htn. HIves pose concern for inflammation cascade risk due to no other found cause. Denies any environmental or food changes. No new meds or high risk meds. 2. Hives  Solumedrol given in ER, prednisone taper being taken daily with food. I affirm this is a Patient Initiated Episode with a Patient who has not had a related appointment within my department in the past 7 days or scheduled within the next 24 hours. Patient identification was verified at the start of the visit: Yes    Total Time: 26 minutes. The visit was conducted pursuant to the emergency declaration under the [address] [place] [address], [address] [place] [address] authority and the [employer] and [employer] General Act. Patient identification was verified, and a caregiver was present when appropriate. The patient was located in a state where the provider was credentialed to provide care.     Note: not billable if this call serves to triage the patient into an appointment for the relevant concern      Felisa Irizarry, APRN - CNP

## 2021-09-28 NOTE — PROGRESS NOTES
1/3/2017      Tone Fontanez  [address]  [place] [address]                        This is to certify that Tone Fontanez was seen at [facility] today and dismissed and allowed to return to school.    RESTRICTIONS: none          SIGNATURE:___________________________________________,   1/3/2017      Jesus Alves MD     Pt needs phone visit appt today please. Has covid=19 unvaccinated so eligible for monoclonal therapy.      thanks

## 2021-09-29 ENCOUNTER — TELEPHONE (OUTPATIENT)
Dept: PRIMARY CARE CLINIC | Age: 56
End: 2021-09-29

## 2021-09-30 NOTE — TELEPHONE ENCOUNTER
Pt with active covid 19 disease and is doing well continues on prednisone. Hives are almost gone. pepcid helped a lot with the itching and vitamins are helping too. Pt reviewed information for monoclonal antibody infusion and feels he is not going to do that. States \"didn't see anything bad about it I just feel like I'm doing really good\" pt took walk today did get pulse ox from neighbor sat 98%. Will continue to monitor closely.      Michi Hernandez CNP

## 2021-10-04 ENCOUNTER — TELEPHONE (OUTPATIENT)
Dept: PRIMARY CARE CLINIC | Age: 56
End: 2021-10-04

## 2021-10-07 ENCOUNTER — PATIENT MESSAGE (OUTPATIENT)
Dept: PRIMARY CARE CLINIC | Age: 56
End: 2021-10-07

## 2021-10-07 DIAGNOSIS — I10 ESSENTIAL HYPERTENSION: ICD-10-CM

## 2021-10-08 RX ORDER — AMLODIPINE BESYLATE 5 MG/1
TABLET ORAL
Qty: 90 TABLET | Refills: 1 | Status: SHIPPED | OUTPATIENT
Start: 2021-10-08 | End: 2021-10-29 | Stop reason: DRUGHIGH

## 2021-10-08 RX ORDER — ROSUVASTATIN CALCIUM 20 MG/1
20 TABLET, COATED ORAL NIGHTLY
Qty: 90 TABLET | Refills: 1 | Status: SHIPPED | OUTPATIENT
Start: 2021-10-08 | End: 2022-04-22 | Stop reason: SDUPTHER

## 2021-10-08 NOTE — TELEPHONE ENCOUNTER
From: Duke Alves  To: Pamela Vasquez, APRN - CNP  Sent: 10/7/2021 9:08 AM EDT  Subject: Prescription Question    My prescription pharmacy has changed. Please send my prescription to [address]   Thanks Kandy Schmitz

## 2021-10-08 NOTE — TELEPHONE ENCOUNTER
Medication pending.,        Health Maintenance   Topic Date Due    Hepatitis C screen  Never done    COVID-19 Vaccine (1) Never done    HIV screen  Never done    DTaP/Tdap/Td vaccine (1 - Tdap) Never done    Diabetes screen  Never done    Colon cancer screen colonoscopy  Never done    Shingles Vaccine (1 of 2) Never done    Flu vaccine (1) Never done    Lipid screen  10/24/2021    Hepatitis A vaccine  Aged Out    Hepatitis B vaccine  Aged Out    Hib vaccine  Aged Out    Meningococcal (ACWY) vaccine  Aged Out    Pneumococcal 0-64 years Vaccine  Aged Out             (applicable per patient's age: Cancer Screenings, Depression Screening, Fall Risk Screening, Immunizations)    LDL Cholesterol (mg/dL)   Date Value   10/24/2020 58     AST (U/L)   Date Value   09/27/2021 23     ALT (U/L)   Date Value   09/27/2021 24     BUN (mg/dL)   Date Value   09/27/2021 22 (H)      (goal A1C is < 7)   (goal LDL is <100) need 30-50% reduction from baseline     BP Readings from Last 3 Encounters:   09/27/21 (!) 122/91   10/22/19 (!) 142/88   09/18/19 (!) 170/100    (goal /80)      All Future Testing planned in [facility]:  Lab Frequency Next Occurrence   Comprehensive Metabolic Panel Once 35/73/8355   Lipid Panel Once 04/25/2021   Hemoglobin A1C Once 04/25/2021       Next Visit Date:  Future Appointments   Date Time Provider García Brown   10/29/2021  3:00 PM JORGE LUIS Roth - CNP [facility]            There is no problem list on file for this patient.

## 2021-10-29 ENCOUNTER — OFFICE VISIT (OUTPATIENT)
Dept: PRIMARY CARE CLINIC | Age: 56
End: 2021-10-29
Payer: COMMERCIAL

## 2021-10-29 VITALS
WEIGHT: 191 LBS | HEART RATE: 96 BPM | SYSTOLIC BLOOD PRESSURE: 170 MMHG | DIASTOLIC BLOOD PRESSURE: 90 MMHG | BODY MASS INDEX: 25.9 KG/M2 | OXYGEN SATURATION: 97 %

## 2021-10-29 DIAGNOSIS — R73.01 IFG (IMPAIRED FASTING GLUCOSE): ICD-10-CM

## 2021-10-29 DIAGNOSIS — I10 ESSENTIAL HYPERTENSION: ICD-10-CM

## 2021-10-29 DIAGNOSIS — Z00.00 ENCOUNTER FOR WELL ADULT EXAM WITHOUT ABNORMAL FINDINGS: ICD-10-CM

## 2021-10-29 DIAGNOSIS — Z00.00 ANNUAL PHYSICAL EXAM: Primary | ICD-10-CM

## 2021-10-29 DIAGNOSIS — Z13.220 SCREENING CHOLESTEROL LEVEL: ICD-10-CM

## 2021-10-29 DIAGNOSIS — U09.9 POST-COVID-19 CONDITION: ICD-10-CM

## 2021-10-29 DIAGNOSIS — Z12.5 PROSTATE CANCER SCREENING: ICD-10-CM

## 2021-10-29 PROCEDURE — 99396 PREV VISIT EST AGE 40-64: CPT | Performed by: NURSE PRACTITIONER

## 2021-10-29 RX ORDER — AMLODIPINE BESYLATE 10 MG/1
10 TABLET ORAL DAILY
Qty: 90 TABLET | Refills: 0 | Status: SHIPPED | OUTPATIENT
Start: 2021-10-29 | End: 2022-02-07

## 2021-10-29 NOTE — PATIENT INSTRUCTIONS
You may be receiving a survey from [employer] regarding your visit today. You may get this in the mail, through your MyChart or in your email. Please complete the survey to enable us to provide the highest quality of care to you and your family. If you cannot score us as very good ( 5 Stars) on any question, please feel free to call the office to discuss how we could have made your experience exceptional.     Thank You! JORGE LUIS Lockett-CNP  [address] [place], [address] [facility] [place][note 2][address]    Phone: [phone]  Fax: [phone] Office Hours:  Monday: [facility] office location [phone] ([phone]) Offering additional late hours the first Monday of the month until 7 pm.   Tuesday: 8-5 Wednesday: 8-5 Thursday:  Additional hours offered 2 Thursdays a month. Please call to inquire those dates. Fridays: 7:30-4:30        Patient Education      please get Tdap (tetanus/diptheria and pertussis) at local pharmacy. Prostate-Specific Antigen (PSA) Test: About This Test  What is it? A prostate-specific antigen (PSA) test measures the amount of PSA in your blood. PSA is released by a man's prostate gland into his blood. A high PSA level may mean that you have an enlargement, infection, or cancer of the prostate. Why is this test done? You may have this test to:  · Check for prostate cancer. · Watch prostate cancer and see if treatment is working. How do you prepare for the test?  Do not ejaculate during the 2 days before your PSA blood test, either during sex or masturbation. Talk to your doctor about any concerns you have regarding the need for the test, its risks, how it will be done, or what the results will mean. How is the test done? A health professional uses a needle to take a blood sample, usually from the arm. What happens after the test?  · You will probably be able to go home right away.  It depends on the reason for the test.  · You can go back to your usual activities right away. Follow-up care is a key part of your treatment and safety. Be sure to make and go to all appointments, and call your doctor if you are having problems. It's also a good idea to keep a list of the medicines you take. Ask your doctor when you can expect to have your test results. Where can you learn more? Go to [email or web address] org and sign in to your [employer] account. Enter V667 in the Point Inside box to learn more about \"Prostate-Specific Antigen (PSA) Test: About This Test.\"     If you do not have an account, please click on the \"Sign Up Now\" link. Current as of: December 17, 2020               Content Version: 13.0  © 2006-2021 Healthwise, Incorporated. Care instructions adapted under license by [facility] ([facility]). If you have questions about a medical condition or this instruction, always ask your healthcare professional. [place] [address] any warranty or liability for your use of this information.

## 2021-10-29 NOTE — PROGRESS NOTES
Well Adult Note  Name: Soniya Lisa Date: 10/30/2021   MRN: [id number] Sex: Male   Age: 64 y.o. Ethnicity: Declined   : 1965 Race: White (non-)      Cherelle Martins is here for well adult exam.  History:    Annual physical   64 right handed      3 step children  Education hs graduate and associate degree in automotive and diesel technology. Non smoker, no chew tobacco.   Alcohol rare  No hx drug use. 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 No family hx colon cancer  No blood or mucous in stool  No laxatives in stool , BM once a day. Few hemorrhoids in past only. Never had colonoscopy this is baseline. tdap vaccine recommended. Has been 10 years since last one. 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      PSA lab needed. Denies LUTS        Review of Systems   Constitutional: Negative for activity change, chills and fever. HENT: Negative for congestion, rhinorrhea and sore throat. Eyes: Negative. Respiratory: Negative for chest tightness. Cardiovascular: Negative for chest pain and leg swelling. Gastrointestinal: Negative for abdominal distention. Endocrine: Negative for cold intolerance and heat intolerance. Genitourinary: Negative for difficulty urinating. Musculoskeletal: Negative for arthralgias. Neurological: Negative for dizziness. Psychiatric/Behavioral: Negative for agitation. Allergies   Allergen Reactions    Ampicillin          Prior to Visit Medications    Medication Sig Taking? Authorizing Provider   amLODIPine (NORVASC) 10 MG tablet Take 1 tablet by mouth daily Hypertension Yes JORGE LUIS Roth CNP   rosuvastatin (CRESTOR) 20 MG tablet Take 1 tablet by mouth nightly Yes OJRGE LUIS Roth CNP       History reviewed. No pertinent past medical history.     Past Surgical History:   Procedure Laterality Date    CYST REMOVAL neck     HERNIA REPAIR      1970, double hernia         Family History   Problem Relation Age of Onset    Coronary Art Dis Mother     Heart Failure Mother     Thyroid Disease Mother     Heart Disease Father     Heart Attack Father        Social History     Tobacco Use    Smoking status: Never Smoker    Smokeless tobacco: Never Used   Substance Use Topics    Alcohol use: Yes     Comment: occas.  Drug use: Never       Objective   BP (!) 170/90   Pulse 96   Wt 191 lb (86.6 kg)   SpO2 97%   BMI 25.90 kg/m²   Wt Readings from Last 3 Encounters:   10/29/21 191 lb (86.6 kg)   09/27/21 189 lb (85.7 kg)   10/21/20 200 lb (90.7 kg)       Physical Exam  Vitals and nursing note reviewed. Constitutional:       General: He is not in acute distress. Appearance: He is well-developed. HENT:      Head: Normocephalic and atraumatic. Left Ear: External ear normal.      Mouth/Throat:      Pharynx: No oropharyngeal exudate. Eyes:      Pupils: Pupils are equal, round, and reactive to light. Neck:      Vascular: No carotid bruit (neg claudette). Cardiovascular:      Rate and Rhythm: Normal rate and regular rhythm. Pulses: Normal pulses. Heart sounds: Normal heart sounds. No murmur heard. Pulmonary:      Effort: Pulmonary effort is normal. No respiratory distress. Breath sounds: Normal breath sounds. Abdominal:      Palpations: Abdomen is soft. There is no mass. Tenderness: There is no abdominal tenderness. Musculoskeletal:         General: Normal range of motion. Cervical back: Normal range of motion and neck supple. Right lower leg: No edema. Left lower leg: No edema. Lymphadenopathy:      Cervical: No cervical adenopathy. Skin:     Findings: No rash. Neurological:      Mental Status: He is alert and oriented to person, place, and time. Psychiatric:         Behavior: Behavior normal.         Thought Content:  Thought content normal.         Judgment: Judgment normal.           Assessment   Plan   1. Annual physical exam  -     Hemoglobin A1C; Future  -     Basic Metabolic Panel; Future  -     PSA screening; Future  -     Lipid Panel; Future  2. Encounter for well adult exam without abnormal findings  3. Essential hypertension  -     Basic Metabolic Panel; Future  -     Sedimentation Rate; Future  -     C-Reactive Protein; Future  -     amLODIPine (NORVASC) 10 MG tablet; Take 1 tablet by mouth daily Hypertension, Disp-90 tablet, R-0Normal  4. IFG (impaired fasting glucose)  -     Hemoglobin A1C; Future  5. Post-COVID-19 condition  -     Sedimentation Rate; Future  -     C-Reactive Protein; Future  -     Ferritin; Future  6. Prostate cancer screening  -     PSA screening; Future  7. Screening cholesterol level  -     Lipid Panel; Future    Increase amlodipine to 10 mg daily recheck bp . May need to add 2nd med with recent hives presentation with covid will do labs with inflammatory markers. No s/s chest pain/tightness, no nausea back or neck pain. Personalized Preventive Plan   Current Health Maintenance Status    There is no immunization history on file for this patient. Health Maintenance   Topic Date Due    Hepatitis C screen  Never done    COVID-19 Vaccine (1) Never done    HIV screen  Never done    DTaP/Tdap/Td vaccine (1 - Tdap) Never done    Diabetes screen  Never done    Colon cancer screen colonoscopy  Never done    Shingles Vaccine (1 of 2) Never done    Flu vaccine (1) Never done    Lipid screen  10/24/2021    Hepatitis A vaccine  Aged Out    Hepatitis B vaccine  Aged Out    Hib vaccine  Aged Out    Meningococcal (ACWY) vaccine  Aged Out    Pneumococcal 0-64 years Vaccine  Aged Out     Recommendations for [employer] Due: see orders and patient instructions/AVS.  .

## 2021-10-30 PROBLEM — I10 ESSENTIAL HYPERTENSION: Status: ACTIVE | Noted: 2021-10-30

## 2021-10-30 PROBLEM — R73.01 IFG (IMPAIRED FASTING GLUCOSE): Status: ACTIVE | Noted: 2021-10-30

## 2021-10-30 ASSESSMENT — ENCOUNTER SYMPTOMS
SORE THROAT: 0
ABDOMINAL DISTENTION: 0
CHEST TIGHTNESS: 0
RHINORRHEA: 0
EYES NEGATIVE: 1

## 2021-10-31 ENCOUNTER — HOSPITAL ENCOUNTER (OUTPATIENT)
Age: 56
Discharge: HOME | End: 2021-10-31
Payer: COMMERCIAL

## 2021-10-31 DIAGNOSIS — Z13.220 SCREENING CHOLESTEROL LEVEL: ICD-10-CM

## 2021-10-31 DIAGNOSIS — Z12.5 PROSTATE CANCER SCREENING: ICD-10-CM

## 2021-10-31 DIAGNOSIS — U09.9 POST-COVID-19 CONDITION: ICD-10-CM

## 2021-10-31 DIAGNOSIS — I10 ESSENTIAL HYPERTENSION: ICD-10-CM

## 2021-10-31 DIAGNOSIS — R73.01 IFG (IMPAIRED FASTING GLUCOSE): ICD-10-CM

## 2021-10-31 DIAGNOSIS — Z00.00 ANNUAL PHYSICAL EXAM: ICD-10-CM

## 2021-10-31 LAB
ANION GAP SERPL CALCULATED.3IONS-SCNC: 8 MMOL/L (ref 9–17)
BUN BLDV-MCNC: 18 MG/DL (ref 6–20)
BUN/CREAT BLD: 19 (ref 9–20)
C-REACTIVE PROTEIN: <3 MG/L (ref 0–5)
CALCIUM SERPL-MCNC: 9.1 MG/DL (ref 8.6–10.4)
CHLORIDE BLD-SCNC: 104 MMOL/L (ref 98–107)
CHOLESTEROL/HDL RATIO: 2
CHOLESTEROL: 119 MG/DL
CO2: 27 MMOL/L (ref 20–31)
CREAT SERPL-MCNC: 0.95 MG/DL (ref 0.7–1.2)
FERRITIN: 354 UG/L (ref 30–400)
GFR AFRICAN AMERICAN: >60 ML/MIN
GFR NON-AFRICAN AMERICAN: >60 ML/MIN
GFR SERPL CREATININE-BSD FRML MDRD: ABNORMAL ML/MIN/{1.73_M2}
GFR SERPL CREATININE-BSD FRML MDRD: ABNORMAL ML/MIN/{1.73_M2}
GLUCOSE BLD-MCNC: 117 MG/DL (ref 70–99)
HDLC SERPL-MCNC: 61 MG/DL
LDL CHOLESTEROL: 48 MG/DL (ref 0–130)
POTASSIUM SERPL-SCNC: 4.1 MMOL/L (ref 3.7–5.3)
PROSTATE SPECIFIC ANTIGEN: 1.31 UG/L
SEDIMENTATION RATE, ERYTHROCYTE: 1 MM (ref 0–20)
SODIUM BLD-SCNC: 139 MMOL/L (ref 135–144)
TRIGL SERPL-MCNC: 49 MG/DL
VLDLC SERPL CALC-MCNC: NORMAL MG/DL (ref 1–30)

## 2021-10-31 PROCEDURE — 85652 RBC SED RATE AUTOMATED: CPT

## 2021-10-31 PROCEDURE — 83036 HEMOGLOBIN GLYCOSYLATED A1C: CPT

## 2021-10-31 PROCEDURE — 80061 LIPID PANEL: CPT

## 2021-10-31 PROCEDURE — 36415 COLL VENOUS BLD VENIPUNCTURE: CPT

## 2021-10-31 PROCEDURE — 86140 C-REACTIVE PROTEIN: CPT

## 2021-10-31 PROCEDURE — 82728 ASSAY OF FERRITIN: CPT

## 2021-10-31 PROCEDURE — 80048 BASIC METABOLIC PNL TOTAL CA: CPT

## 2021-10-31 PROCEDURE — G0103 PSA SCREENING: HCPCS

## 2021-11-01 LAB
ESTIMATED AVERAGE GLUCOSE: 114 MG/DL
HBA1C MFR BLD: 5.6 % (ref 4–6)

## 2021-11-03 ENCOUNTER — NURSE ONLY (OUTPATIENT)
Dept: PRIMARY CARE CLINIC | Age: 56
End: 2021-11-03
Payer: COMMERCIAL

## 2021-11-03 VITALS — DIASTOLIC BLOOD PRESSURE: 88 MMHG | SYSTOLIC BLOOD PRESSURE: 138 MMHG | HEART RATE: 86 BPM | OXYGEN SATURATION: 98 %

## 2022-02-05 DIAGNOSIS — I10 ESSENTIAL HYPERTENSION: ICD-10-CM

## 2022-02-07 RX ORDER — AMLODIPINE BESYLATE 10 MG/1
TABLET ORAL
Qty: 90 TABLET | Refills: 3 | Status: SHIPPED | OUTPATIENT
Start: 2022-02-07 | End: 2022-04-22 | Stop reason: SDUPTHER

## 2022-04-22 ENCOUNTER — OFFICE VISIT (OUTPATIENT)
Dept: PRIMARY CARE CLINIC | Age: 57
End: 2022-04-22
Payer: COMMERCIAL

## 2022-04-22 VITALS
BODY MASS INDEX: 26.55 KG/M2 | OXYGEN SATURATION: 97 % | DIASTOLIC BLOOD PRESSURE: 88 MMHG | SYSTOLIC BLOOD PRESSURE: 150 MMHG | HEART RATE: 95 BPM | HEIGHT: 72 IN | WEIGHT: 196 LBS

## 2022-04-22 DIAGNOSIS — R73.01 IFG (IMPAIRED FASTING GLUCOSE): ICD-10-CM

## 2022-04-22 DIAGNOSIS — E78.00 HYPERCHOLESTEROLEMIA: ICD-10-CM

## 2022-04-22 DIAGNOSIS — I10 ESSENTIAL HYPERTENSION: Primary | ICD-10-CM

## 2022-04-22 LAB — HBA1C MFR BLD: 5.7 %

## 2022-04-22 PROCEDURE — 83036 HEMOGLOBIN GLYCOSYLATED A1C: CPT | Performed by: NURSE PRACTITIONER

## 2022-04-22 PROCEDURE — 99213 OFFICE O/P EST LOW 20 MIN: CPT | Performed by: NURSE PRACTITIONER

## 2022-04-22 RX ORDER — ROSUVASTATIN CALCIUM 20 MG/1
20 TABLET, COATED ORAL NIGHTLY
Qty: 90 TABLET | Refills: 1 | Status: SHIPPED | OUTPATIENT
Start: 2022-04-22 | End: 2022-07-11 | Stop reason: SDUPTHER

## 2022-04-22 RX ORDER — LOSARTAN POTASSIUM 25 MG/1
25 TABLET ORAL DAILY
Qty: 30 TABLET | Refills: 0 | Status: SHIPPED | OUTPATIENT
Start: 2022-04-22 | End: 2022-06-13 | Stop reason: SDUPTHER

## 2022-04-22 RX ORDER — AMLODIPINE BESYLATE 10 MG/1
10 TABLET ORAL DAILY
Qty: 90 TABLET | Refills: 3 | Status: SHIPPED | OUTPATIENT
Start: 2022-04-22 | End: 2022-10-06 | Stop reason: SDUPTHER

## 2022-04-22 NOTE — PROGRESS NOTES
[address] [facility] PR[facility] [address] [address]  Dept: [phone]  Dept Fax: [phone]    Last encounter 10/29/2021    Hypertension (6 month F/U. Pt states no concerns at this time) and Blood Sugar Problem       HPI:   Justyn Soto is a 62 y.o. male who presentstoday for his medical conditions/complaints as noted below. Justyn Soto is c/o of Hypertension (6 month F/U. Pt states no concerns at this time) and Blood Sugar Problem      HPI  Established patient    Follow up htn, hyperlipidemia  Doing well  bp elevated today  Pt works full time and also volunteer for local EMS. Lab Results   Component Value Date     10/31/2021    K 4.1 10/31/2021     10/31/2021    CO2 27 10/31/2021    BUN 18 10/31/2021    CREATININE 0.95 10/31/2021    GLUCOSE 117 10/31/2021    CALCIUM 9.1 10/31/2021      Lab Results   Component Value Date    WBC 5.4 09/27/2021    HGB 15.8 09/27/2021    HCT 46.3 09/27/2021    MCV 91.0 09/27/2021     (L) 09/27/2021       Hypertension: elevated  Pt on max dose norvasc and we discussed adding losartan to being bp to goal.   Modifiable risks: salt intake, diet, weight management BMI 26  unmodifiable : age, gender, environmental risks.        Hyperlipidemia on crestor  Lab Results   Component Value Date    CHOL 119 10/31/2021    CHOL 126 10/24/2020    CHOL 253 (H) 12/05/2019     Lab Results   Component Value Date    TRIG 49 10/31/2021    TRIG 68 10/24/2020    TRIG 116 12/05/2019     Lab Results   Component Value Date    HDL 61 10/31/2021    HDL 54 10/24/2020    HDL 63 12/05/2019     Lab Results   Component Value Date    LDLCHOLESTEROL 48 10/31/2021    LDLCHOLESTEROL 58 10/24/2020    LDLCHOLESTEROL 167 (H) 12/05/2019     Lab Results   Component Value Date    VLDL NOT REPORTED 10/31/2021    VLDL NOT REPORTED 10/24/2020    VLDL NOT REPORTED 12/05/2019     Lab Results   Component Value Date CHOLHDLRATIO 2.0 10/31/2021    CHOLHDLRATIO 2.3 10/24/2020    CHOLHDLRATIO 4.0 12/05/2019       Reviewed prior notes None  Reviewed previous Labs, Imaging and Hospital Records    History reviewed. No pertinent past medical history. Past Surgical History:   Procedure Laterality Date    CYST REMOVAL      neck     HERNIA REPAIR      1970, double hernia       Family History   Problem Relation Age of Onset    Coronary Art Dis Mother     Heart Failure Mother     Thyroid Disease Mother     Heart Disease Father     Heart Attack Father        Social History     Tobacco Use    Smoking status: Never Smoker    Smokeless tobacco: Never Used   Substance Use Topics    Alcohol use: Yes     Comment: occas. Current Outpatient Medications   Medication Sig Dispense Refill    amLODIPine (NORVASC) 10 MG tablet Take 1 tablet by mouth daily 90 tablet 3    losartan (COZAAR) 25 MG tablet Take 1 tablet by mouth daily 30 tablet 0    rosuvastatin (CRESTOR) 20 MG tablet Take 1 tablet by mouth nightly 90 tablet 1     No current facility-administered medications for this visit. Allergies   Allergen Reactions    Ampicillin        Health Maintenance   Topic Date Due    COVID-19 Vaccine (1) Never done    HIV screen  Never done    Hepatitis C screen  Never done    DTaP/Tdap/Td vaccine (1 - Tdap) Never done    Colorectal Cancer Screen  Never done    Shingles Vaccine (1 of 2) Never done    Flu vaccine (Season Ended) 09/01/2022    Depression Screen  09/28/2022    Lipids  10/31/2022    Potassium  10/31/2022    Creatinine  10/31/2022    A1C test (Diabetic or Prediabetic)  04/22/2023    Hepatitis A vaccine  Aged Out    Hepatitis B vaccine  Aged Out    Hib vaccine  Aged Out    Meningococcal (ACWY) vaccine  Aged Out    Pneumococcal 0-64 years Vaccine  Aged Out       Subjective:      Review of Systems   Constitutional: Negative for activity change, chills and fever. HENT: Negative for congestion and rhinorrhea. Eyes: Negative for discharge. Respiratory: Negative for cough and chest tightness. Cardiovascular: Negative for chest pain and leg swelling. Gastrointestinal: Negative for abdominal distention and nausea. Endocrine: Negative for cold intolerance and heat intolerance. Genitourinary: Negative for difficulty urinating. Musculoskeletal: Negative for arthralgias and gait problem. Skin: Negative for rash. Neurological: Negative for dizziness and headaches. Psychiatric/Behavioral: Negative for sleep disturbance. The patient is not nervous/anxious. Objective:     Physical Exam  Vitals and nursing note reviewed. Constitutional:       General: He is not in acute distress. Appearance: Normal appearance. He is well-developed. HENT:      Head: Normocephalic and atraumatic. Right Ear: Tympanic membrane normal.      Left Ear: Tympanic membrane and external ear normal.      Nose: No congestion or rhinorrhea. Mouth/Throat:      Mouth: Mucous membranes are moist.      Pharynx: No oropharyngeal exudate. Eyes:      Pupils: Pupils are equal, round, and reactive to light. Neck:      Vascular: No carotid bruit (neg claudette ). Cardiovascular:      Rate and Rhythm: Normal rate and regular rhythm. Pulses: Normal pulses. Heart sounds: Normal heart sounds. No murmur heard. Pulmonary:      Effort: Pulmonary effort is normal. No respiratory distress. Breath sounds: Normal breath sounds. Abdominal:      Palpations: Abdomen is soft. There is no mass. Tenderness: There is no abdominal tenderness. Musculoskeletal:         General: Normal range of motion. Cervical back: Normal range of motion and neck supple. Right lower leg: No edema. Left lower leg: No edema. Lymphadenopathy:      Cervical: No cervical adenopathy. Skin:     General: Skin is warm. Findings: No rash.    Neurological:      Mental Status: He is alert and oriented to person, place, and time.   Psychiatric:         Behavior: Behavior normal.         Thought Content: Thought content normal.         Judgment: Judgment normal.       BP (!) 150/88 (Site: Left Upper Arm)   Pulse 95   Ht 6' (1.829 m)   Wt 196 lb (88.9 kg)   SpO2 97%   BMI 26.58 kg/m²     Data:     Lab Results   Component Value Date     10/31/2021    K 4.1 10/31/2021     10/31/2021    CO2 27 10/31/2021    BUN 18 10/31/2021    CREATININE 0.95 10/31/2021    GLUCOSE 117 10/31/2021    PROT 6.9 09/27/2021    LABALBU 4.1 09/27/2021    BILITOT 0.42 09/27/2021    ALKPHOS 84 09/27/2021    AST 23 09/27/2021    ALT 24 09/27/2021     Lab Results   Component Value Date    WBC 5.4 09/27/2021    RBC 5.08 09/27/2021    HGB 15.8 09/27/2021    HCT 46.3 09/27/2021    MCV 91.0 09/27/2021    MCH 31.1 09/27/2021    MCHC 34.2 09/27/2021    RDW 13.4 09/27/2021     09/27/2021    MPV NOT REPORTED 09/27/2021     Lab Results   Component Value Date    TSH 1.74 09/19/2019     Lab Results   Component Value Date    CHOL 119 10/31/2021    HDL 61 10/31/2021    PSA 1.31 10/31/2021    LABA1C 5.7 04/22/2022          Assessment & Plan       1. Essential hypertension  Not at goal  Add losartan daily start with 1/2 pill daily and see if it bring to goal, if still elevated then increase to whole pill per day. - amLODIPine (NORVASC) 10 MG tablet; Take 1 tablet by mouth daily  Dispense: 90 tablet; Refill: 3  - losartan (COZAAR) 25 MG tablet; Take 1 tablet by mouth daily  Dispense: 30 tablet; Refill: 0    2. Hypercholesterolemia  Continue statin  - rosuvastatin (CRESTOR) 20 MG tablet; Take 1 tablet by mouth nightly  Dispense: 90 tablet; Refill: 1    3.  IFG (impaired fasting glucose)    - POCT glycosylated hemoglobin (Hb A1C)                  Completed Refills   Requested Prescriptions     Signed Prescriptions Disp Refills    amLODIPine (NORVASC) 10 MG tablet 90 tablet 3     Sig: Take 1 tablet by mouth daily    losartan (COZAAR) 25 MG tablet 30 tablet 0 Sig: Take 1 tablet by mouth daily    rosuvastatin (CRESTOR) 20 MG tablet 90 tablet 1     Sig: Take 1 tablet by mouth nightly     No follow-ups on file. Orders Placed This Encounter   Medications    amLODIPine (NORVASC) 10 MG tablet     Sig: Take 1 tablet by mouth daily     Dispense:  90 tablet     Refill:  3    losartan (COZAAR) 25 MG tablet     Sig: Take 1 tablet by mouth daily     Dispense:  30 tablet     Refill:  0    rosuvastatin (CRESTOR) 20 MG tablet     Sig: Take 1 tablet by mouth nightly     Dispense:  90 tablet     Refill:  1     We are requesting this refill to have on file for next month s order. Orders Placed This Encounter   Procedures    POCT glycosylated hemoglobin (Hb A1C)            On this date 4/22/2022 I have spent 26 minutes reviewing previous notes, test results and face to face with the patient discussing the diagnosis and importance of compliance with the treatment plan as well as documenting on the day of the visit.      Electronically signed by JORGE LUIS Casey CNP on 4/24/2022 at 5:19 PM

## 2022-04-24 PROBLEM — E78.00 HYPERCHOLESTEROLEMIA: Status: ACTIVE | Noted: 2022-04-24

## 2022-04-24 ASSESSMENT — ENCOUNTER SYMPTOMS
CHEST TIGHTNESS: 0
EYE DISCHARGE: 0
ABDOMINAL DISTENTION: 0
NAUSEA: 0
COUGH: 0
RHINORRHEA: 0

## 2022-06-12 DIAGNOSIS — I10 ESSENTIAL HYPERTENSION: ICD-10-CM

## 2022-06-13 DIAGNOSIS — I10 ESSENTIAL HYPERTENSION: ICD-10-CM

## 2022-06-13 RX ORDER — LOSARTAN POTASSIUM 25 MG/1
25 TABLET ORAL DAILY
Qty: 90 TABLET | Refills: 2 | Status: SHIPPED | OUTPATIENT
Start: 2022-06-13 | End: 2022-09-03 | Stop reason: SDUPTHER

## 2022-06-13 RX ORDER — LOSARTAN POTASSIUM 25 MG/1
25 TABLET ORAL DAILY
Qty: 90 TABLET | Refills: 1 | Status: CANCELLED | OUTPATIENT
Start: 2022-06-13

## 2022-06-14 NOTE — TELEPHONE ENCOUNTER
See if pt can stop in for bp, refilled losartan which was added last visit.  Need to make sure optimal control < 140/80

## 2022-07-11 DIAGNOSIS — E78.00 HYPERCHOLESTEROLEMIA: ICD-10-CM

## 2022-07-13 RX ORDER — ROSUVASTATIN CALCIUM 20 MG/1
20 TABLET, COATED ORAL NIGHTLY
Qty: 90 TABLET | Refills: 1 | Status: SHIPPED | OUTPATIENT
Start: 2022-07-13

## 2022-07-19 NOTE — TELEPHONE ENCOUNTER
LVM to call office to see why cholesterol med is delayed, seeing that we did approve and sent to [facility] for 90 day supply.

## 2022-07-20 NOTE — TELEPHONE ENCOUNTER
Called [facility] [phone]  they stated they have a note on file that the pt. Prefers to have Rx's on hold until he calls to fill and send. I contacted Mr. Dominga Thomson and he will be calling [facility] today to release and have sent.

## 2022-09-03 DIAGNOSIS — I10 ESSENTIAL HYPERTENSION: ICD-10-CM

## 2022-09-07 RX ORDER — LOSARTAN POTASSIUM 25 MG/1
25 TABLET ORAL DAILY
Qty: 90 TABLET | Refills: 2 | Status: SHIPPED | OUTPATIENT
Start: 2022-09-07

## 2022-10-06 DIAGNOSIS — I10 ESSENTIAL HYPERTENSION: ICD-10-CM

## 2022-10-07 RX ORDER — AMLODIPINE BESYLATE 10 MG/1
10 TABLET ORAL DAILY
Qty: 90 TABLET | Refills: 3 | Status: SHIPPED | OUTPATIENT
Start: 2022-10-07

## 2022-11-09 ENCOUNTER — OFFICE VISIT (OUTPATIENT)
Dept: PRIMARY CARE CLINIC | Age: 57
End: 2022-11-09
Payer: COMMERCIAL

## 2022-11-09 VITALS
WEIGHT: 194 LBS | SYSTOLIC BLOOD PRESSURE: 172 MMHG | OXYGEN SATURATION: 96 % | HEIGHT: 72 IN | BODY MASS INDEX: 26.28 KG/M2 | DIASTOLIC BLOOD PRESSURE: 88 MMHG | HEART RATE: 94 BPM

## 2022-11-09 DIAGNOSIS — Z13.1 SCREENING FOR DIABETES MELLITUS: ICD-10-CM

## 2022-11-09 DIAGNOSIS — E78.00 HYPERCHOLESTEROLEMIA: ICD-10-CM

## 2022-11-09 DIAGNOSIS — Z00.00 ENCOUNTER FOR WELL ADULT EXAM WITHOUT ABNORMAL FINDINGS: Primary | ICD-10-CM

## 2022-11-09 DIAGNOSIS — Z12.5 PROSTATE CANCER SCREENING: ICD-10-CM

## 2022-11-09 DIAGNOSIS — R73.01 IFG (IMPAIRED FASTING GLUCOSE): ICD-10-CM

## 2022-11-09 DIAGNOSIS — I10 ESSENTIAL HYPERTENSION: ICD-10-CM

## 2022-11-09 DIAGNOSIS — Z00.00 ANNUAL PHYSICAL EXAM: ICD-10-CM

## 2022-11-09 DIAGNOSIS — Z82.49 FAMILY HISTORY OF HEART DISEASE IN FEMALE FAMILY MEMBER BEFORE AGE 65: ICD-10-CM

## 2022-11-09 DIAGNOSIS — Z13.0 SCREENING, ANEMIA, DEFICIENCY, IRON: ICD-10-CM

## 2022-11-09 LAB — HBA1C MFR BLD: 5.4 %

## 2022-11-09 PROCEDURE — 99396 PREV VISIT EST AGE 40-64: CPT | Performed by: NURSE PRACTITIONER

## 2022-11-09 PROCEDURE — 3079F DIAST BP 80-89 MM HG: CPT | Performed by: NURSE PRACTITIONER

## 2022-11-09 PROCEDURE — 83036 HEMOGLOBIN GLYCOSYLATED A1C: CPT | Performed by: NURSE PRACTITIONER

## 2022-11-09 PROCEDURE — 3074F SYST BP LT 130 MM HG: CPT | Performed by: NURSE PRACTITIONER

## 2022-11-09 RX ORDER — LOSARTAN POTASSIUM 25 MG/1
25 TABLET ORAL DAILY
Qty: 90 TABLET | Refills: 0 | Status: SHIPPED | OUTPATIENT
Start: 2022-11-09

## 2022-11-09 SDOH — ECONOMIC STABILITY: FOOD INSECURITY: WITHIN THE PAST 12 MONTHS, THE FOOD YOU BOUGHT JUST DIDN'T LAST AND YOU DIDN'T HAVE MONEY TO GET MORE.: NEVER TRUE

## 2022-11-09 SDOH — ECONOMIC STABILITY: FOOD INSECURITY: WITHIN THE PAST 12 MONTHS, YOU WORRIED THAT YOUR FOOD WOULD RUN OUT BEFORE YOU GOT MONEY TO BUY MORE.: NEVER TRUE

## 2022-11-09 ASSESSMENT — PATIENT HEALTH QUESTIONNAIRE - PHQ9
SUM OF ALL RESPONSES TO PHQ QUESTIONS 1-9: 0
SUM OF ALL RESPONSES TO PHQ QUESTIONS 1-9: 0
SUM OF ALL RESPONSES TO PHQ9 QUESTIONS 1 & 2: 0
2. FEELING DOWN, DEPRESSED OR HOPELESS: 0
1. LITTLE INTEREST OR PLEASURE IN DOING THINGS: 0
SUM OF ALL RESPONSES TO PHQ QUESTIONS 1-9: 0
SUM OF ALL RESPONSES TO PHQ QUESTIONS 1-9: 0

## 2022-11-09 ASSESSMENT — SOCIAL DETERMINANTS OF HEALTH (SDOH): HOW HARD IS IT FOR YOU TO PAY FOR THE VERY BASICS LIKE FOOD, HOUSING, MEDICAL CARE, AND HEATING?: NOT HARD AT ALL

## 2022-11-09 NOTE — PATIENT INSTRUCTIONS
Phone: [phone]  Fax: [phone] [facility] Office Hours:  Monday: Sarah Last office location [phone] ([phone]) Offering additional late hours the first Monday of the month until 7 pm.   Tuesday: 8-5 Wednesday: 8-5 Thursday:  Additional hours offered 2 Thursdays a month. Please call to inquire those dates. Fridays: 7:30-4:30   SURVEY:    You may be receiving a survey from [employer] regarding your visit today. Please complete the survey to enable us to provide the highest quality of care to you and your family. If you cannot score us a very good on any question, please call the office to discuss how we could have made your experience a very good one. Thank you. A Healthy Lifestyle: Care Instructions  A healthy lifestyle can help you feel good, have more energy, and stay at a weight that's healthy for you. You can share a healthy lifestyle with your friends and family. And you can do it on your own. Eat meals with your friends or family. You could try cooking together. Plan activities with other people. Go for a walk with a friend, try a free online fitness class, or join a sports league. Eat a variety of healthy foods. These include fruits, vegetables, whole grains, low-fat dairy, and lean protein. Choose healthy portions of food. You can use the Nutrition Facts label on food packages as a guide. Eat more fruits and vegetables. You could add vegetables to sandwiches or add fruit to cereal.  Drink water when you are thirsty. Limit soda, juice, and sports drinks. Try to exercise most days. Aim for at least 2½ hours of exercise each week. Keep moving. Work in the garden or take your dog on a walk. Use the stairs instead of the elevator. If you use tobacco or nicotine, try to quit. Ask your doctor about programs and medicines to help you quit. Limit alcohol. Men should have no more than 2 drinks a day. Women should have no more than 1.  For some people, no alcohol is the best choice. Follow-up care is a key part of your treatment and safety. Be sure to make and go to all appointments, and call your doctor if you are having problems. It's also a good idea to know your test results and keep a list of the medicines you take. Where can you learn more? Go to [email or web address] org and sign in to your [employer] account. Enter V276 in the [facility] box to learn more about \"A Healthy Lifestyle: Care Instructions. \"     If you do not have an account, please click on the \"Sign Up Now\" link. Current as of: March 9, 2022               Content Version: 13.4  © 2006-2022 [employer]. Care instructions adapted under license by [facility] [place] ([facility]). If you have questions about a medical condition or this instruction, always ask your healthcare professional. [place] [address] any warranty or liability for your use of this information. Heart-Healthy Diet   Sodium, Fat, and Cholesterol Controlled Diet       What Is a Heart Healthy Diet? A heart-healthy diet is one that limits sodium , certain types of fat , and cholesterol . This type of diet is recommended for:   People with any form of cardiovascular disease (eg, coronary heart disease , peripheral vascular disease , previous heart attack , previous stroke )   People with risk factors for cardiovascular disease, such as high blood pressure , high cholesterol , or diabetes   Anyone who wants to lower their risk of developing cardiovascular disease   Sodium    Sodium is a mineral found in many foods. In general, most people consume much more sodium than they need. Diets high in sodium can increase blood pressure and lead to edema (water retention). On a heart-healthy diet, you should consume no more than 2,300 mg (milligrams) of sodium per dayabout the amount in one teaspoon of table salt.  The foods highest in sodium include table salt (about 50% sodium), processed foods, convenience foods, and preserved foods. Cholesterol    Cholesterol is a fat-like, waxy substance in your blood. Our bodies make some cholesterol. It is also found in animal products, with the highest amounts in fatty meat, egg yolks, whole milk, cheese, shellfish, and organ meats. On a heart-healthy diet, you should limit your cholesterol intake to less than 200 mg per day. It is normal and important to have some cholesterol in your bloodstream. But too much cholesterol can cause plaque to build up within your arteries, which can eventually lead to a heart attack or stroke. The two types of cholesterol that are most commonly referred to are:   Low-density lipoprotein (LDL) cholesterol  Also known as bad cholesterol, this is the cholesterol that tends to build up along your arteries. Bad cholesterol levels are increased by eating fats that are saturated or hydrogenated. Optimal level of this cholesterol is less than 100. Over 130 starts to get risky for heart disease. High-density lipoprotein (HDL) cholesterol  Also known as good cholesterol, this type of cholesterol actually carries cholesterol away from your arteries and may, therefore, help lower your risk of having a heart attack. You want this level to be high (ideally greater than 60). It is a risk to have a level less than 40. You can raise this good cholesterol by eating olive oil, canola oil, avocados, or nuts. Exercise raises this level, too. Fat    Fat is calorie dense and packs a lot of calories into a small amount of food. Even though fats should be limited due to their high calorie content, not all fats are bad. In fact, some fats are quite healthful. Fat can be broken down into four main types.    The good-for-you fats are:   Monounsaturated fat  found in oils such as olive and canola, avocados, and nuts and natural nut butters; can decrease cholesterol levels, while keeping levels of HDL cholesterol high   Polyunsaturated fat  found in oils such as safflower, sunflower, soybean, corn, and sesame; can decrease total cholesterol and LDL cholesterol   Omega-3 fatty acids  particularly those found in fatty fish (such as salmon, trout, tuna, mackerel, herring, and sardines); can decrease risk of arrhythmias, decrease triglyceride levels, and slightly lower blood pressure   The fats that you want to limit are:   Saturated fat  found in animal products, many fast foods, and a few vegetables; increases total blood cholesterol, including LDL levels   Animal fats that are saturated include: butter, lard, whole-milk dairy products, meat fat, and poultry skin   Vegetable fats that are saturated include: hydrogenated shortening, palm oil, coconut oil, cocoa butter   Hydrogenated or trans fat  found in margarine and vegetable shortening, most shelf stable snack foods, and fried foods; increases LDL and decreases HDL     It is generally recommended that you limit your total fat for the day to less than 30% of your total calories. If you follow an 1800-calorie heart healthy diet, for example, this would mean 60 grams of fat or less per day. Saturated fat and trans fat in your diet raises your blood cholesterol the most, much more than dietary cholesterol does. For this reason, on a heart-healthy diet, less than 7% of your calories should come from saturated fat and ideally 0% from trans fat. On an 1800-calorie diet, this translates into less than 14 grams of saturated fat per day, leaving 46 grams of fat to come from mono- and polyunsaturated fats.    Food Choices on a Heart Healthy Diet   Food Category   Foods Recommended   Foods to Avoid   Grains   Breads and rolls without salted tops Most dry and cooked cereals Unsalted crackers and breadsticks Low-sodium or homemade breadcrumbs or stuffing All rice and pastas   Breads, rolls, and crackers with salted tops High-fat baked goods (eg, muffins, donuts, pastries) Quick breads, self-rising flour, and biscuit mixes Regular bread crumbs Instant hot cereals Commercially prepared rice, pasta, or stuffing mixes   Vegetables   Most fresh, frozen, and low-sodium canned vegetables Low-sodium and salt-free vegetable juices Canned vegetables if unsalted or rinsed   Regular canned vegetables and juices, including sauerkraut and pickled vegetables Frozen vegetables with sauces Commercially prepared potato and vegetable mixes   Fruits   Most fresh, frozen, and canned fruits All fruit juices   Fruits processed with salt or sodium   Milk   Nonfat or low-fat (1%) milk Nonfat or low-fat yogurt Cottage cheese, low-fat ricotta, cheeses labeled as low-fat and low-sodium   Whole milk Reduced-fat (2%) milk Malted and chocolate milk Full fat yogurt Most cheeses (unless low-fat and low salt) Buttermilk (no more than 1 cup per week)   Meats and Beans   Lean cuts of fresh or frozen beef, veal, lamb, or pork (look for the word loin) Fresh or frozen poultry without the skin Fresh or frozen fish and some shellfish Egg whites and egg substitutes (Limit whole eggs to three per week) Tofu Nuts or seeds (unsalted, dry-roasted), low-sodium peanut butter Dried peas, beans, and lentils   Any smoked, cured, salted, or canned meat, fish, or poultry (including woo, chipped beef, cold cuts, hot dogs, sausages, sardines, and anchovies) Poultry skins Breaded and/or fried fish or meats Canned peas, beans, and lentils Salted nuts   Fats and Oils   Olive oil and canola oil Low-sodium, low-fat salad dressings and mayonnaise   Butter, margarine, coconut and palm oils, woo fat   Snacks, Sweets, and Condiments   Low-sodium or unsalted versions of broths, soups, soy sauce, and condiments Pepper, herbs, and spices; vinegar, lemon, or lime juice Low-fat frozen desserts (yogurt, sherbet, fruit bars) Sugar, cocoa powder, honey, syrup, jam, and preserves Low-fat, trans-fat free cookies, cakes, and pies Jose Raul and animal crackers, fig bars, mercy snaps   High-fat desserts Broth, soups, gravies, and sauces, made from instant mixes or other high-sodium ingredients Salted snack foods Canned olives Meat tenderizers, seasoning salt, and most flavored vinegars   Beverages   Low-sodium carbonated beverages Tea and coffee in moderation Soy milk   Commercially softened water   Suggestions   Make whole grains, fruits, and vegetables the base of your diet. Choose heart-healthy fats such as canola, olive, and flaxseed oil, and foods high in heart-healthy fats, such as nuts, seeds, soybeans, tofu, and fish. Eat fish at least twice per week; the fish highest in omega-3 fatty acids and lowest in mercury include salmon, herring, mackerel, sardines, and canned chunk light tuna. If you eat fish less than twice per week or have high triglycerides, talk to your doctor about taking fish oil supplements. Read food labels. For products low in fat and cholesterol, look for fat free, low-fat, cholesterol free, saturated fat free, and trans fat freeAlso scan the Nutrition Facts Label, which lists saturated fat, trans fat, and cholesterol amounts. For products low in sodium, look for sodium free, very low sodium, low sodium, no added salt, and unsalted   Skip the salt when cooking or at the table; if food needs more flavor, get creative and try out different herbs and spices. Garlic and onion also add substantial flavor to foods. Trim any visible fat off meat and poultry before cooking, and drain the fat off after charles. Use cooking methods that require little or no added fat, such as grilling, boiling, baking, poaching, broiling, roasting, steaming, stir-frying, and sauting. Avoid fast food and convenience food. They tend to be high in saturated and trans fat and have a lot of added salt. Talk to a registered dietitian for individualized diet advice.       Last Reviewed: March 2011 Jesus Alberto Osorio MS, MPH, RD   Updated: 3/29/2011

## 2022-11-09 NOTE — PROGRESS NOTES
LDLCHOLESTEROL 48 10/31/2021        Lab Results   Component Value Date    LABA1C 5.4 11/09/2022     Lab Results   Component Value Date     10/31/2021      Lab Results   Component Value Date/Time     10/31/2021 10:07 AM    K 4.1 10/31/2021 10:07 AM     10/31/2021 10:07 AM    CO2 27 10/31/2021 10:07 AM    BUN 18 10/31/2021 10:07 AM    CREATININE 0.95 10/31/2021 10:07 AM    GLUCOSE 117 10/31/2021 10:07 AM    CALCIUM 9.1 10/31/2021 10:07 AM      Lab Results   Component Value Date    TSH 1.74 09/19/2019     Lab Results   Component Value Date    WBC 5.4 09/27/2021    HGB 15.8 09/27/2021    HCT 46.3 09/27/2021    MCV 91.0 09/27/2021     (L) 09/27/2021     No results found for this or any previous visit (from the past 4464 hour(s)). Hx  COvid 19 Illness:  Yes    Date: 9/27/21    COVID 19 Vaccine status (which vaccine and dates) : none per pt's choice     9/27/21 covid-19 illness with presentation onset of HIVES went to ER given solumedrol, prednisone, pepcid, benadryl, declined any further treatment. Denies any skin lesions or moles. Prostate health - agreeable to PSA, denies LUTS>     Review of Systems   Constitutional:  Negative for activity change, chills and fever. HENT:  Negative for congestion, sinus pressure and sore throat. Eyes: Negative. Respiratory:  Negative for apnea. Cardiovascular:  Negative for leg swelling. Gastrointestinal:  Negative for abdominal distention. Endocrine: Negative for cold intolerance and heat intolerance. Genitourinary:  Negative for difficulty urinating and frequency. Musculoskeletal:  Negative for back pain and joint swelling. Neurological:  Negative for light-headedness. Hematological:  Negative for adenopathy. Psychiatric/Behavioral:  Negative for decreased concentration and sleep disturbance. The patient is not nervous/anxious.       Allergies   Allergen Reactions    Ampicillin          Prior to Visit Medications Medication Sig Taking? Authorizing Provider   losartan (COZAAR) 25 MG tablet Take 1 tablet by mouth daily hypertension Yes JORGE LUIS Hurtado CNP   amLODIPine (NORVASC) 10 MG tablet Take 1 tablet by mouth daily Yes JORGE LUIS Hurtado CNP   rosuvastatin (CRESTOR) 20 MG tablet Take 1 tablet by mouth nightly Yes JORGE LUIS Hurtado CNP         Past Medical History:   Diagnosis Date    COVID-19 09/27/2021    Hives with onset       Past Surgical History:   Procedure Laterality Date    CYST REMOVAL      neck     HERNIA REPAIR      1970, double hernia         Family History   Problem Relation Age of Onset    Coronary Art Dis Mother     Heart Failure Mother     Thyroid Disease Mother     Heart Disease Father     Heart Attack Father     Other Sister         adopted    Other Maternal Grandfather         old age       Social History     Tobacco Use    Smoking status: Never    Smokeless tobacco: Never   Substance Use Topics    Alcohol use: Yes     Comment: occas. Drug use: Never       Objective   BP (!) 172/88 (Site: Left Upper Arm, Position: Sitting)   Pulse 94   Ht 6' (1.829 m)   Wt 194 lb (88 kg)   SpO2 96%   BMI 26.31 kg/m²   Wt Readings from Last 3 Encounters:   11/09/22 194 lb (88 kg)   04/22/22 196 lb (88.9 kg)   10/29/21 191 lb (86.6 kg)     There were no vitals filed for this visit. Physical Exam  Vitals and nursing note reviewed. Constitutional:       General: He is not in acute distress. Appearance: Normal appearance. He is well-developed. HENT:      Head: Normocephalic and atraumatic. Right Ear: Tympanic membrane and external ear normal.      Left Ear: Tympanic membrane and external ear normal.      Nose: No rhinorrhea. Mouth/Throat:      Mouth: Mucous membranes are moist.      Pharynx: No oropharyngeal exudate. Eyes:      Pupils: Pupils are equal, round, and reactive to light. Neck:      Vascular: No carotid bruit (neg claudette).    Cardiovascular:      Rate and Rhythm: Normal rate and regular rhythm. Pulses: Normal pulses. Heart sounds: Normal heart sounds. No murmur heard. Pulmonary:      Effort: Pulmonary effort is normal. No respiratory distress. Breath sounds: Normal breath sounds. Abdominal:      Palpations: Abdomen is soft. There is no mass. Tenderness: There is no abdominal tenderness. Musculoskeletal:         General: Normal range of motion. Cervical back: Normal range of motion and neck supple. Right lower leg: No edema. Left lower leg: No edema. Lymphadenopathy:      Cervical: No cervical adenopathy. Skin:     General: Skin is warm. Findings: No rash. Neurological:      Mental Status: He is alert and oriented to person, place, and time. Psychiatric:         Behavior: Behavior normal.         Thought Content: Thought content normal.         Judgment: Judgment normal.         Assessment   Plan   1. Encounter for well adult exam without abnormal findings  2. Annual physical exam  -     Lipid Panel; Future  -     PSA Screening; Future  -     Comprehensive Metabolic Panel; Future  -     CBC with Auto Differential; Future  -     EKG 12 lead; Future  -     Rena - Eric Baker MD, Cardiology, [place] [facility]  3. Essential hypertension  -     losartan (COZAAR) 25 MG tablet; Take 1 tablet by mouth daily hypertension, Disp-90 tablet, R-0Normal  -     CBC with Auto Differential; Future  -     EKG 12 lead; Future  -     Echocardiogram complete; Future  -     Rena - rEic Baker MD, Cardiology, [place]  4. Hypercholesterolemia  -     Lipid Panel; Future  5. Family history of heart disease in female family member before age 72  -     EKG 12 lead; Future  -     Echocardiogram complete; Future  -     Rena Baker MD, Cardiology, [place] [facility]  6. Prostate cancer screening  -     PSA Screening; Future  7. IFG (impaired fasting glucose)  -     POCT glycosylated hemoglobin (Hb A1C)  8.  Screening for diabetes mellitus  - Comprehensive Metabolic Panel; Future  9. Screening, anemia, deficiency, iron  -     CBC with Auto Differential; Future       Refer to cardiology     Increase losartan from 12.5 mg to 25 mg   Daily bp/hr log  Continue amlodipine and statin. Personalized Preventive Plan   Current Health Maintenance Status    There is no immunization history on file for this patient. Health Maintenance   Topic Date Due    COVID-19 Vaccine (1) Never done    HIV screen  Never done    Hepatitis C screen  Never done    DTaP/Tdap/Td vaccine (1 - Tdap) Never done    Colorectal Cancer Screen  Never done    Shingles vaccine (1 of 2) Never done    Flu vaccine (1) Never done    Lipids  10/31/2022    A1C test (Diabetic or Prediabetic)  11/09/2023    Depression Screen  11/09/2023    Hepatitis A vaccine  Aged Out    Hib vaccine  Aged Out    Meningococcal (ACWY) vaccine  Aged Out    Pneumococcal 0-64 years Vaccine  Aged Out     Recommendations for Statwing Due: see orders and patient instructions/AVS.    Return in 6 months (on 5/9/2023).

## 2022-11-10 ASSESSMENT — ENCOUNTER SYMPTOMS
BACK PAIN: 0
SINUS PRESSURE: 0
SORE THROAT: 0
APNEA: 0
EYES NEGATIVE: 1
ABDOMINAL DISTENTION: 0

## 2022-11-14 DIAGNOSIS — I10 ESSENTIAL HYPERTENSION: Primary | ICD-10-CM

## 2022-11-14 DIAGNOSIS — R73.01 IFG (IMPAIRED FASTING GLUCOSE): ICD-10-CM

## 2022-11-22 DIAGNOSIS — E78.00 HYPERCHOLESTEROLEMIA: ICD-10-CM

## 2022-11-23 RX ORDER — ROSUVASTATIN CALCIUM 20 MG/1
20 TABLET, COATED ORAL NIGHTLY
Qty: 90 TABLET | Refills: 1 | Status: SHIPPED | OUTPATIENT
Start: 2022-11-23

## 2022-11-25 ENCOUNTER — HOSPITAL ENCOUNTER (OUTPATIENT)
Age: 57
Discharge: HOME | End: 2022-11-25
Payer: COMMERCIAL

## 2022-11-25 DIAGNOSIS — Z13.1 SCREENING FOR DIABETES MELLITUS: ICD-10-CM

## 2022-11-25 DIAGNOSIS — I10 ESSENTIAL HYPERTENSION: ICD-10-CM

## 2022-11-25 DIAGNOSIS — Z13.0 SCREENING, ANEMIA, DEFICIENCY, IRON: ICD-10-CM

## 2022-11-25 DIAGNOSIS — E78.00 HYPERCHOLESTEROLEMIA: ICD-10-CM

## 2022-11-25 DIAGNOSIS — Z12.5 PROSTATE CANCER SCREENING: ICD-10-CM

## 2022-11-25 DIAGNOSIS — Z00.00 ANNUAL PHYSICAL EXAM: ICD-10-CM

## 2022-11-25 LAB
ABSOLUTE EOS #: 0 K/UL (ref 0–0.4)
ABSOLUTE LYMPH #: 1.3 K/UL (ref 1–4.8)
ABSOLUTE MONO #: 0.3 K/UL (ref 0–1)
ALBUMIN SERPL-MCNC: 4.4 G/DL (ref 3.5–5.2)
ALP BLD-CCNC: 75 U/L (ref 40–129)
ALT SERPL-CCNC: 19 U/L (ref 5–41)
ANION GAP SERPL CALCULATED.3IONS-SCNC: 11 MMOL/L (ref 9–17)
AST SERPL-CCNC: 16 U/L
BASOPHILS # BLD: 0 % (ref 0–2)
BASOPHILS ABSOLUTE: 0 K/UL (ref 0–0.2)
BILIRUB SERPL-MCNC: 0.7 MG/DL (ref 0.3–1.2)
BUN BLDV-MCNC: 16 MG/DL (ref 6–20)
BUN/CREAT BLD: 16 (ref 9–20)
CALCIUM SERPL-MCNC: 9 MG/DL (ref 8.6–10.4)
CHLORIDE BLD-SCNC: 104 MMOL/L (ref 98–107)
CHOLESTEROL/HDL RATIO: 2.1
CHOLESTEROL: 112 MG/DL
CO2: 27 MMOL/L (ref 20–31)
CREAT SERPL-MCNC: 0.98 MG/DL (ref 0.7–1.2)
DIFFERENTIAL TYPE: YES
EOSINOPHILS RELATIVE PERCENT: 1 % (ref 0–5)
GFR SERPL CREATININE-BSD FRML MDRD: >60 ML/MIN/1.73M2
GLUCOSE BLD-MCNC: 105 MG/DL (ref 70–99)
HCT VFR BLD CALC: 46.9 % (ref 41–53)
HDLC SERPL-MCNC: 54 MG/DL
HEMOGLOBIN: 15.7 G/DL (ref 13.5–17.5)
LDL CHOLESTEROL: 43 MG/DL (ref 0–130)
LYMPHOCYTES # BLD: 25 % (ref 13–44)
MCH RBC QN AUTO: 31.3 PG (ref 26–34)
MCHC RBC AUTO-ENTMCNC: 33.5 G/DL (ref 31–37)
MCV RBC AUTO: 93.3 FL (ref 80–100)
MONOCYTES # BLD: 6 % (ref 5–9)
PATIENT FASTING?: YES
PDW BLD-RTO: 13.2 % (ref 12.1–15.2)
PLATELET # BLD: 162 K/UL (ref 140–450)
POTASSIUM SERPL-SCNC: 4.3 MMOL/L (ref 3.7–5.3)
PROSTATE SPECIFIC ANTIGEN: 0.83 NG/ML
RBC # BLD: 5.03 M/UL (ref 4.5–5.9)
SEG NEUTROPHILS: 68 % (ref 39–75)
SEGMENTED NEUTROPHILS ABSOLUTE COUNT: 3.6 K/UL (ref 2.1–6.5)
SODIUM BLD-SCNC: 142 MMOL/L (ref 135–144)
TOTAL PROTEIN: 6.8 G/DL (ref 6.4–8.3)
TRIGL SERPL-MCNC: 77 MG/DL
WBC # BLD: 5.3 K/UL (ref 3.5–11)

## 2022-11-25 PROCEDURE — 80053 COMPREHEN METABOLIC PANEL: CPT

## 2022-11-25 PROCEDURE — G0103 PSA SCREENING: HCPCS

## 2022-11-25 PROCEDURE — 80061 LIPID PANEL: CPT

## 2022-11-25 PROCEDURE — 85025 COMPLETE CBC W/AUTO DIFF WBC: CPT

## 2022-11-25 PROCEDURE — 36415 COLL VENOUS BLD VENIPUNCTURE: CPT

## 2022-11-29 ENCOUNTER — NURSE ONLY (OUTPATIENT)
Dept: PRIMARY CARE CLINIC | Age: 57
End: 2022-11-29

## 2022-11-29 VITALS
OXYGEN SATURATION: 98 % | BODY MASS INDEX: 26.31 KG/M2 | SYSTOLIC BLOOD PRESSURE: 134 MMHG | WEIGHT: 194 LBS | HEART RATE: 84 BPM | DIASTOLIC BLOOD PRESSURE: 82 MMHG

## 2022-11-29 DIAGNOSIS — Z01.30 BLOOD PRESSURE CHECK: Primary | ICD-10-CM

## 2022-11-29 NOTE — PROGRESS NOTES
Pt presents today for a blood pressure check. Denies HA, SOB, CP, or dizziness. BP's have been running 140's/90's    Have you had your Blood Pressure medications 2 hours Prior to this appointment? Yes  Any recent change in Blood Pressure medication?  No

## 2022-12-12 ENCOUNTER — HOSPITAL ENCOUNTER (OUTPATIENT)
Age: 57
End: 2022-12-12
Payer: COMMERCIAL

## 2022-12-12 ENCOUNTER — HOSPITAL ENCOUNTER (OUTPATIENT)
Age: 57
Discharge: HOME | End: 2022-12-14
Payer: COMMERCIAL

## 2022-12-12 ENCOUNTER — HOSPITAL ENCOUNTER (OUTPATIENT)
Dept: GENERAL RADIOLOGY | Age: 57
Discharge: HOME | End: 2022-12-14
Payer: COMMERCIAL

## 2022-12-12 ENCOUNTER — OFFICE VISIT (OUTPATIENT)
Dept: CARDIOLOGY CLINIC | Age: 57
End: 2022-12-12
Payer: COMMERCIAL

## 2022-12-12 ENCOUNTER — HOSPITAL ENCOUNTER (OUTPATIENT)
Age: 57
Discharge: HOME | End: 2022-12-12
Payer: COMMERCIAL

## 2022-12-12 VITALS
BODY MASS INDEX: 26.99 KG/M2 | WEIGHT: 199 LBS | SYSTOLIC BLOOD PRESSURE: 148 MMHG | HEART RATE: 104 BPM | DIASTOLIC BLOOD PRESSURE: 90 MMHG | OXYGEN SATURATION: 97 %

## 2022-12-12 DIAGNOSIS — I10 ESSENTIAL HYPERTENSION: ICD-10-CM

## 2022-12-12 DIAGNOSIS — R73.01 IFG (IMPAIRED FASTING GLUCOSE): ICD-10-CM

## 2022-12-12 DIAGNOSIS — Z82.49 FAMILY HISTORY OF HEART DISEASE IN FEMALE FAMILY MEMBER BEFORE AGE 65: ICD-10-CM

## 2022-12-12 DIAGNOSIS — I10 ESSENTIAL HYPERTENSION: Primary | ICD-10-CM

## 2022-12-12 DIAGNOSIS — Z00.00 ANNUAL PHYSICAL EXAM: ICD-10-CM

## 2022-12-12 LAB
EKG ATRIAL RATE: 85 BPM
EKG P AXIS: 55 DEGREES
EKG P-R INTERVAL: 142 MS
EKG Q-T INTERVAL: 382 MS
EKG QRS DURATION: 110 MS
EKG QTC CALCULATION (BAZETT): 454 MS
EKG R AXIS: 53 DEGREES
EKG T AXIS: 47 DEGREES
EKG VENTRICULAR RATE: 85 BPM

## 2022-12-12 PROCEDURE — 93005 ELECTROCARDIOGRAM TRACING: CPT

## 2022-12-12 PROCEDURE — 3074F SYST BP LT 130 MM HG: CPT | Performed by: INTERNAL MEDICINE

## 2022-12-12 PROCEDURE — 93010 ELECTROCARDIOGRAM REPORT: CPT | Performed by: INTERNAL MEDICINE

## 2022-12-12 PROCEDURE — 3079F DIAST BP 80-89 MM HG: CPT | Performed by: INTERNAL MEDICINE

## 2022-12-12 PROCEDURE — 71046 X-RAY EXAM CHEST 2 VIEWS: CPT

## 2022-12-12 PROCEDURE — 99204 OFFICE O/P NEW MOD 45 MIN: CPT | Performed by: INTERNAL MEDICINE

## 2022-12-12 RX ORDER — LOSARTAN POTASSIUM 25 MG/1
25 TABLET ORAL 2 TIMES DAILY
COMMUNITY

## 2022-12-12 NOTE — LETTER
Felicia Vega MD  [facility]  [facility]  [address] [facility] [address], [place]  [phone]      2022      Coit Sin, CNP  [address] [facility] [address], [place]      RE:   Tess Mask  :  1965      Dear Tin Paul:    CHIEF COMPLAINT:  Hypertension. HISTORY OF PRESENT ILLNESS:  I had the pleasure of seeing Mr. Michelle Davis in our office on 2022. He is a pleasant 40-year-old gentleman who has been treated for hypertension for two years. He has tried several medications including lisinopril/hydrochlorothiazide 10/12.5 daily, lisinopril 5 mg daily, and Norvasc 5 mg daily. He is currently on losartan 25 mg daily. He had been placed on Toprol, which he had stopped. At home, his blood pressure is generally in the 130 to 160 range. He attributes this to \"I am just a very busy person. \"  Denies any chest pain or chest discomfort, denies any unusual shortness of breath. 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 He is not on a regular exercise program but again does stay very active. He has never had a myocardial infarction or cardiac catheterization. He has never had a stress test.    CARDIAC RISK FACTORS:  Hypertension:  Positive. Hyperlipidemia:  Positive. Peripheral Vascular Disease:  Negative. Smoking:  Negative. Other Family Members:  Positive. Diabetes:  Negative. MEDICATIONS AT THIS TIME:  He is on losartan 25 mg daily and Crestor 20 mg daily. PAST MEDICAL AND SURGICAL HISTORY:  1. He had COVID on 2021.  2.  Cyst removed from his neck. 3.  Hernia repair in , which was a double hernia. FAMILY HISTORY:  His father had a myocardial infarction. Mother had coronary artery disease. SOCIAL HISTORY:  He is 62years old. Works at [employer] where he has worked for 34 years, hoping to retire at age 58.   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 Three stepchildren, ages 36, 27 and 29. One granddaughter. He does not smoke, does not drink alcohol. REVIEW OF SYSTEMS:  Cardiac as above. Other systems reviewed including constitutional, eyes, ears, nose and throat, cardiovascular, respiratory, GI, , musculoskeletal, integumentary, neurologic, endocrine, hematologic and allergic/immunologic are negative except what is described above. No weight loss or weight gain. No change in bowel habits. No blood in stools. No fevers, sweats or chills. PHYSICAL EXAMINATION:  VITAL SIGNS:  His blood pressure was 168/89 with a heart rate of 104 and regular. Respiratory rate 18. O2 sat 97%. Weight 199 pounds. GENERAL:  He is a pleasant 26-year-old gentleman. Denied pain. He was oriented to person, place and time. Answered questions appropriately. SKIN:  No unusual skin changes. HEENT:  The pupils are equally round and intact. Mucous membranes were dry. NECK:  No JVD. Good carotid pulses. No carotid bruits. No lymphadenopathy or thyromegaly. CARDIOVASCULAR EXAM:  S1 and S2 were normal.  No S3 or S4. Soft systolic blowing type murmur. No diastolic murmur. PMI was normal.  No lift, thrust, or pericardial friction rub. LUNGS:  Clear to auscultation and percussion. ABDOMEN:  Soft and nontender. Good bowel sounds. EXTREMITIES:  Good femoral pulses. Good pedal pulses. No pedal edema. Skin was warm and dry. No calf tenderness. Nail beds pink. Good cap refill. PULSES:  Bilateral symmetrical radial, brachial and carotid pulses. No carotid bruits. Good femoral and pedal pulses. NEUROLOGIC EXAM:  Within normal limits. PSYCHIATRIC EXAM:  Within normal limits. LABORATORY DATA:  Sodium was 142, potassium 4.3, BUN was 16, creatinine 0.98, GFR greater than 60, glucose 105, calcium was 9.0. Cholesterol 112, HDL 54, LDL 43, triglycerides 77. ALT was 19, AST was 16. Hemoglobin A1c was 5.4.   White count 5.3, hemoglobin 15.7 with a platelet count [phone]. His EKG showed normal sinus rhythm and was normal.    Chest x-ray was unremarkable. Bedside echocardiogram showed normal LV size and function with ejection fraction of 60%, right-sided chambers appeared to be normal.  Mild LVH. IMPRESSION:  1. Hypertension. 2.  Hyperlipidemia, under good control. PLAN:  1. Increase losartan to 25 mg b.i.d.  2.  Monitor blood pressure and heart rate daily at different times of the day and call in two weeks with readings. 3.  We will titrate losartan to its maximum, and if still elevated, we will add Hygroton 25 mg daily. This would be followed by Cardura if needed. DISCUSSION:  Mr. Valentino Rivera has hypertension. It evidently was fairly well controlled but has been elevating recently. He is not at the stage that I would do catecholamines, etc.  If he fails four-drug therapy, then would do blood work for catecholamines and consider renal artery imaging. He has no chest pain or shortness of breath to indicate that we need to do a stress test.  Bedside echocardiogram looked good with mild LVH. He will be in touch with us for his blood pressures. We will hopefully be able to get his blood pressure under control. Thank you very much for allowing me the privilege of seeing Mr. Valentino Rivera. If you have any questions on my thoughts, please do not hesitate to contact me.      Sincerely,        Juhi Carrion    D: 12/18/2022 20:32:26     T: 12/18/2022 20:35:45     [id number]  Job#: [id number]   Doc#: [id number]

## 2022-12-12 NOTE — PATIENT INSTRUCTIONS
Will INCREASE Losartan 25 mg to one tablet twice a day     Monitor BP/HR daily - different times of the day- call in 2 weeks with readings    Will get CXR today- will not need Echo or Mg.

## 2022-12-12 NOTE — PROGRESS NOTES
Ov  Dr. Stallings Ours for new pt   Referred by Ray Mclaughlin for HTN  She has been adjusting med   But BP still running high   Home bp 130-160's   \"I am just a very busy person\"  Other meds tried Lisinopril/HCTZ10/12.5  - lisinopril 5 mg -Norvasc 5 mg   Htn x 2 years  No cp  No sob       Works at [employer] x 34 yrs  Possibly retire age 58  Also asst chief and EMS  At the [employer]   3 step children- 40/30/28  1 grand dtg       Will INCREASE Losartan 25 mg to one tablet twice a day     Monitor BP/HR daily - different times of the day- call in 2 weeks with readings    Will get CXR today- will not need Echo or Mg.

## 2022-12-15 RX ORDER — METOPROLOL SUCCINATE 50 MG/1
50 TABLET, EXTENDED RELEASE ORAL DAILY
Qty: 30 TABLET | Refills: 5 | Status: SHIPPED | OUTPATIENT
Start: 2022-12-15

## 2022-12-20 NOTE — PROGRESS NOTES
Crystal Ramsey MD  [facility]  [facility]  [address] [facility] [address], [place]  [phone]      2022      Perez Iverson, TU  [address] [facility] [address], [place] [address]      RE:   Helen Main  :  1965      Dear Anniece Boast:    CHIEF COMPLAINT:  Hypertension. HISTORY OF PRESENT ILLNESS:  I had the pleasure of seeing Mr. Roxanne Crystal in our office on 2022. He is a pleasant 80-year-old gentleman who has been treated for hypertension for two years. He has tried several medications including lisinopril/hydrochlorothiazide 10/12.5 daily, lisinopril 5 mg daily, and Norvasc 5 mg daily. He is currently on losartan 25 mg daily. He had been placed on Toprol, which he had stopped. At home, his blood pressure is generally in the 130 to 160 range. He attributes this to \"I am just a very busy person. \"  Denies any chest pain or chest discomfort, denies any unusual shortness of breath. 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 He is not on a regular exercise program but again does stay very active. He has never had a myocardial infarction or cardiac catheterization. He has never had a stress test.    CARDIAC RISK FACTORS:  Hypertension:  Positive. Hyperlipidemia:  Positive. Peripheral Vascular Disease:  Negative. Smoking:  Negative. Other Family Members:  Positive. Diabetes:  Negative. MEDICATIONS AT THIS TIME:  He is on losartan 25 mg daily and Crestor 20 mg daily. PAST MEDICAL AND SURGICAL HISTORY:  1. He had COVID on 2021.  2.  Cyst removed from his neck. 3.  Hernia repair in , which was a double hernia. FAMILY HISTORY:  His father had a myocardial infarction. Mother had coronary artery disease. SOCIAL HISTORY:  He is 62years old. Works at [employer] where he has worked for 34 years, hoping to retire at age 58.   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 Three stepchildren, ages 36, 27 and 29. One granddaughter. He does not smoke, does not drink alcohol. REVIEW OF SYSTEMS:  Cardiac as above. Other systems reviewed including constitutional, eyes, ears, nose and throat, cardiovascular, respiratory, GI, , musculoskeletal, integumentary, neurologic, endocrine, hematologic and allergic/immunologic are negative except what is described above. No weight loss or weight gain. No change in bowel habits. No blood in stools. No fevers, sweats or chills. PHYSICAL EXAMINATION:  VITAL SIGNS:  His blood pressure was 168/89 with a heart rate of 104 and regular. Respiratory rate 18. O2 sat 97%. Weight 199 pounds. GENERAL:  He is a pleasant 63-year-old gentleman. Denied pain. He was oriented to person, place and time. Answered questions appropriately. SKIN:  No unusual skin changes. HEENT:  The pupils are equally round and intact. Mucous membranes were dry. NECK:  No JVD. Good carotid pulses. No carotid bruits. No lymphadenopathy or thyromegaly. CARDIOVASCULAR EXAM:  S1 and S2 were normal.  No S3 or S4. Soft systolic blowing type murmur. No diastolic murmur. PMI was normal.  No lift, thrust, or pericardial friction rub. LUNGS:  Clear to auscultation and percussion. ABDOMEN:  Soft and nontender. Good bowel sounds. EXTREMITIES:  Good femoral pulses. Good pedal pulses. No pedal edema. Skin was warm and dry. No calf tenderness. Nail beds pink. Good cap refill. PULSES:  Bilateral symmetrical radial, brachial and carotid pulses. No carotid bruits. Good femoral and pedal pulses. NEUROLOGIC EXAM:  Within normal limits. PSYCHIATRIC EXAM:  Within normal limits. LABORATORY DATA:  Sodium was 142, potassium 4.3, BUN was 16, creatinine 0.98, GFR greater than 60, glucose 105, calcium was 9.0. Cholesterol 112, HDL 54, LDL 43, triglycerides 77. ALT was 19, AST was 16. Hemoglobin A1c was 5.4.   White count 5.3, hemoglobin 15.7 with a platelet count [phone]. His EKG showed normal sinus rhythm and was normal.    Chest x-ray was unremarkable. Bedside echocardiogram showed normal LV size and function with ejection fraction of 60%, right-sided chambers appeared to be normal.  Mild LVH. IMPRESSION:  1. Hypertension. 2.  Hyperlipidemia, under good control. PLAN:  1. Increase losartan to 25 mg b.i.d.  2.  Monitor blood pressure and heart rate daily at different times of the day and call in two weeks with readings. 3.  We will titrate losartan to its maximum, and if still elevated, we will add Hygroton 25 mg daily. This would be followed by Cardura if needed. DISCUSSION:  Mr. Lachelle Floyd has hypertension. It evidently was fairly well controlled but has been elevating recently. He is not at the stage that I would do catecholamines, etc.  If he fails four-drug therapy, then would do blood work for catecholamines and consider renal artery imaging. He has no chest pain or shortness of breath to indicate that we need to do a stress test.  Bedside echocardiogram looked good with mild LVH. He will be in touch with us for his blood pressures. We will hopefully be able to get his blood pressure under control. Thank you very much for allowing me the privilege of seeing Mr. Lachelle Floyd. If you have any questions on my thoughts, please do not hesitate to contact me.      Sincerely,        Juan Brewster    D: 12/18/2022 20:32:26     T: 12/18/2022 20:35:45     [id number]  Job#: [id number]   Doc#: [id number]

## 2023-01-17 DIAGNOSIS — E78.00 HYPERCHOLESTEROLEMIA: ICD-10-CM

## 2023-01-17 RX ORDER — LOSARTAN POTASSIUM 25 MG/1
25 TABLET ORAL 2 TIMES DAILY
Qty: 60 TABLET | Refills: 0 | Status: SHIPPED | OUTPATIENT
Start: 2023-01-17 | End: 2023-01-19 | Stop reason: SDUPTHER

## 2023-01-17 RX ORDER — ROSUVASTATIN CALCIUM 20 MG/1
20 TABLET, COATED ORAL NIGHTLY
Qty: 90 TABLET | Refills: 1 | Status: SHIPPED | OUTPATIENT
Start: 2023-01-17

## 2023-01-17 RX ORDER — METOPROLOL SUCCINATE 50 MG/1
50 TABLET, EXTENDED RELEASE ORAL DAILY
Qty: 30 TABLET | Refills: 0 | Status: SHIPPED | OUTPATIENT
Start: 2023-01-17 | End: 2023-01-19 | Stop reason: SDUPTHER

## 2023-05-06 SDOH — ECONOMIC STABILITY: FOOD INSECURITY: WITHIN THE PAST 12 MONTHS, YOU WORRIED THAT YOUR FOOD WOULD RUN OUT BEFORE YOU GOT MONEY TO BUY MORE.: NEVER TRUE

## 2023-05-06 SDOH — ECONOMIC STABILITY: TRANSPORTATION INSECURITY
IN THE PAST 12 MONTHS, HAS LACK OF TRANSPORTATION KEPT YOU FROM MEETINGS, WORK, OR FROM GETTING THINGS NEEDED FOR DAILY LIVING?: NO

## 2023-05-06 SDOH — ECONOMIC STABILITY: HOUSING INSECURITY
IN THE LAST 12 MONTHS, WAS THERE A TIME WHEN YOU DID NOT HAVE A STEADY PLACE TO SLEEP OR SLEPT IN A SHELTER (INCLUDING NOW)?: NO

## 2023-05-06 SDOH — ECONOMIC STABILITY: INCOME INSECURITY: HOW HARD IS IT FOR YOU TO PAY FOR THE VERY BASICS LIKE FOOD, HOUSING, MEDICAL CARE, AND HEATING?: NOT HARD AT ALL

## 2023-05-06 SDOH — ECONOMIC STABILITY: FOOD INSECURITY: WITHIN THE PAST 12 MONTHS, THE FOOD YOU BOUGHT JUST DIDN'T LAST AND YOU DIDN'T HAVE MONEY TO GET MORE.: NEVER TRUE

## 2023-05-09 ENCOUNTER — OFFICE VISIT (OUTPATIENT)
Dept: PRIMARY CARE CLINIC | Age: 58
End: 2023-05-09
Payer: COMMERCIAL

## 2023-05-09 VITALS
SYSTOLIC BLOOD PRESSURE: 144 MMHG | BODY MASS INDEX: 27.36 KG/M2 | HEIGHT: 72 IN | WEIGHT: 202 LBS | HEART RATE: 70 BPM | DIASTOLIC BLOOD PRESSURE: 72 MMHG | OXYGEN SATURATION: 96 %

## 2023-05-09 DIAGNOSIS — I10 ESSENTIAL HYPERTENSION: Primary | ICD-10-CM

## 2023-05-09 DIAGNOSIS — E78.00 HYPERCHOLESTEROLEMIA: ICD-10-CM

## 2023-05-09 DIAGNOSIS — Z12.11 COLON CANCER SCREENING: ICD-10-CM

## 2023-05-09 PROCEDURE — 3074F SYST BP LT 130 MM HG: CPT | Performed by: NURSE PRACTITIONER

## 2023-05-09 PROCEDURE — 3078F DIAST BP <80 MM HG: CPT | Performed by: NURSE PRACTITIONER

## 2023-05-09 PROCEDURE — 99213 OFFICE O/P EST LOW 20 MIN: CPT | Performed by: NURSE PRACTITIONER

## 2023-05-09 RX ORDER — ROSUVASTATIN CALCIUM 20 MG/1
20 TABLET, COATED ORAL NIGHTLY
Qty: 90 TABLET | Refills: 1 | Status: SHIPPED | OUTPATIENT
Start: 2023-05-09

## 2023-05-09 RX ORDER — METOPROLOL SUCCINATE 50 MG/1
50 TABLET, EXTENDED RELEASE ORAL DAILY
Qty: 90 TABLET | Refills: 1 | Status: SHIPPED | OUTPATIENT
Start: 2023-05-09

## 2023-05-09 RX ORDER — LOSARTAN POTASSIUM 25 MG/1
25 TABLET ORAL 2 TIMES DAILY
Qty: 180 TABLET | Refills: 1 | Status: SHIPPED | OUTPATIENT
Start: 2023-05-09

## 2023-05-09 ASSESSMENT — PATIENT HEALTH QUESTIONNAIRE - PHQ9
SUM OF ALL RESPONSES TO PHQ QUESTIONS 1-9: 0
SUM OF ALL RESPONSES TO PHQ QUESTIONS 1-9: 0
SUM OF ALL RESPONSES TO PHQ9 QUESTIONS 1 & 2: 0
2. FEELING DOWN, DEPRESSED OR HOPELESS: 0
SUM OF ALL RESPONSES TO PHQ QUESTIONS 1-9: 0
SUM OF ALL RESPONSES TO PHQ QUESTIONS 1-9: 0
1. LITTLE INTEREST OR PLEASURE IN DOING THINGS: 0

## 2023-05-09 NOTE — PATIENT INSTRUCTIONS
Phone: [phone]  Fax: [facility] Office Hours:  Monday: [facility] office location 8-5 ([phone]) Offering additional late hours the first Monday of the month until 7 pm.   Tuesday: 8-5 Wednesday: 8-5 Thursday:  Additional hours offered 2 Thursdays a month. Please call to inquire those dates. Fridays: 7:30-4:30   SURVEY:    You may be receiving a survey from [employer] regarding your visit today. Please complete the survey to enable us to provide the highest quality of care to you and your family. If you cannot score us a very good on any question, please call the office to discuss how we could have made your experience a very good one. Thank you.        Shingles vaccine [facility])  2 injection series given 2 months apart, both injections done lifetime immunity   Tdap vaccine

## 2023-05-09 NOTE — PROGRESS NOTES
[facility] [address] [address]  Dept: [phone]  Dept Fax: [phone]    Last encounter 11/9/2022    Hypertension (6 month check up), Hyperlipidemia (6 month check up/), and Colonoscopy (Pt due, never had one)       HPI:   Yordy Salter is a 62 y.o. male who presentstoday for his medical conditions/complaints as noted below. Yordy Salter is c/o of Hypertension (6 month check up), Hyperlipidemia (6 month check up/), and Colonoscopy (Pt due, never had one)      HPI  Established patient      Hypertension - exercise, does not add salt, sweet corn only. Vision normal , reading glasses. Seeing eye  , routinely    Hyperlipidemia on statin crestor tolerating it well   Good hydration   Denies myalgia  Lab Results   Component Value Date    LDLCHOLESTEROL 43 11/25/2022     Lab Results   Component Value Date    ALT 19 11/25/2022    AST 16 11/25/2022    ALKPHOS 75 11/25/2022    BILITOT 0.7 11/25/2022     Vaccine [facility] : [facility]      Reviewed prior notes Cardiology  Reviewed previous Labs, Imaging, and Hospital Records    Past Medical History:   Diagnosis Date    COVID-19 09/27/2021    Hives with onset      Past Surgical History:   Procedure Laterality Date    CYST REMOVAL      neck     HERNIA REPAIR      1970, double hernia       Family History   Problem Relation Age of Onset    Coronary Art Dis Mother     Heart Failure Mother     Thyroid Disease Mother     Heart Disease Father     Heart Attack Father     Other Sister         adopted    Other Maternal Grandfather         old age       Social History     Tobacco Use    Smoking status: Never    Smokeless tobacco: Never   Substance Use Topics    Alcohol use: Yes     Comment: occas.       Current Outpatient Medications   Medication Sig Dispense Refill    losartan (COZAAR) 25 MG tablet Take 1 tablet by

## 2023-05-10 ASSESSMENT — ENCOUNTER SYMPTOMS
ABDOMINAL PAIN: 0
RHINORRHEA: 0
COUGH: 1
SORE THROAT: 1
EYES NEGATIVE: 1

## 2023-05-12 ENCOUNTER — TELEPHONE (OUTPATIENT)
Dept: FAMILY MEDICINE CLINIC | Age: 58
End: 2023-05-12

## 2023-05-14 ENCOUNTER — HOSPITAL ENCOUNTER (OUTPATIENT)
Age: 58
Discharge: HOME | End: 2023-05-14
Payer: COMMERCIAL

## 2023-05-14 DIAGNOSIS — I10 ESSENTIAL HYPERTENSION: ICD-10-CM

## 2023-05-14 DIAGNOSIS — E78.00 HYPERCHOLESTEROLEMIA: ICD-10-CM

## 2023-05-14 LAB
ALBUMIN SERPL-MCNC: 4.1 G/DL (ref 3.5–5.2)
ALP SERPL-CCNC: 69 U/L (ref 40–129)
ALT SERPL-CCNC: 19 U/L (ref 5–41)
ANION GAP SERPL CALCULATED.3IONS-SCNC: 8 MMOL/L (ref 9–17)
AST SERPL-CCNC: 18 U/L
BILIRUB SERPL-MCNC: 0.6 MG/DL (ref 0.3–1.2)
BUN SERPL-MCNC: 24 MG/DL (ref 6–20)
BUN/CREAT BLD: 24 (ref 9–20)
CALCIUM SERPL-MCNC: 8.9 MG/DL (ref 8.6–10.4)
CHLORIDE SERPL-SCNC: 106 MMOL/L (ref 98–107)
CO2 SERPL-SCNC: 26 MMOL/L (ref 20–31)
CREAT SERPL-MCNC: 1.02 MG/DL (ref 0.7–1.2)
GFR SERPL CREATININE-BSD FRML MDRD: >60 ML/MIN/1.73M2
GLUCOSE SERPL-MCNC: 118 MG/DL (ref 70–99)
POTASSIUM SERPL-SCNC: 4.2 MMOL/L (ref 3.7–5.3)
PROT SERPL-MCNC: 6.7 G/DL (ref 6.4–8.3)
SODIUM SERPL-SCNC: 140 MMOL/L (ref 135–144)

## 2023-05-14 PROCEDURE — 36415 COLL VENOUS BLD VENIPUNCTURE: CPT

## 2023-05-14 PROCEDURE — 80053 COMPREHEN METABOLIC PANEL: CPT

## 2023-05-24 ENCOUNTER — OFFICE VISIT (OUTPATIENT)
Dept: GASTROENTEROLOGY | Age: 58
End: 2023-05-24
Payer: COMMERCIAL

## 2023-05-24 VITALS
RESPIRATION RATE: 19 BRPM | HEIGHT: 72 IN | HEART RATE: 76 BPM | BODY MASS INDEX: 26.68 KG/M2 | OXYGEN SATURATION: 96 % | WEIGHT: 197 LBS

## 2023-05-24 DIAGNOSIS — Z01.818 PREOP TESTING: ICD-10-CM

## 2023-05-24 DIAGNOSIS — Z12.11 COLON CANCER SCREENING: Primary | ICD-10-CM

## 2023-05-24 PROCEDURE — 99202 OFFICE O/P NEW SF 15 MIN: CPT | Performed by: NURSE PRACTITIONER

## 2023-05-24 RX ORDER — POLYETHYLENE GLYCOL 3350, SODIUM CHLORIDE, SODIUM BICARBONATE, POTASSIUM CHLORIDE 420; 11.2; 5.72; 1.48 G/4L; G/4L; G/4L; G/4L
4000 POWDER, FOR SOLUTION ORAL ONCE
Qty: 4000 ML | Refills: 0 | Status: SHIPPED | OUTPATIENT
Start: 2023-05-24 | End: 2023-05-24

## 2023-05-24 ASSESSMENT — ENCOUNTER SYMPTOMS
GASTROINTESTINAL NEGATIVE: 1
RESPIRATORY NEGATIVE: 1
ALLERGIC/IMMUNOLOGIC NEGATIVE: 1

## 2023-05-24 NOTE — PROGRESS NOTES
[address]    Chief Complaint   Patient presents with    Colonoscopy     Patient has never had a colonoscopy. He denies a family hx of colon Ca. He also denies any bowel changes. ZEINA Teixeira is a 62 y.o. old male who has a past medical history of hypertension, impaired fasting glucose, hypercholesterolemia presenting for his initial colon cancer screening colonoscopy. Family history of colon cancer: No  Blood in stool: No  Unintentional weight loss: No  Abdominal pain: No  Prior colonoscopy: No  Constipation history: No  Number of bowel movements a day: 1  Change in stool caliber: No  History of GERD or Acid Reflux: No  Use of PPI's. No    Past Surgical History:   Procedure Laterality Date    CYST REMOVAL      neck     HERNIA REPAIR      1970, double hernia         Current Outpatient Medications   Medication Sig Dispense Refill    polyethylene glycol-electrolytes (NULYTELY WITH FLAVOR PACKS) 420 g solution Take 4,000 mLs by mouth once for 1 dose 4000 mL 0    losartan (COZAAR) 25 MG tablet Take 1 tablet by mouth in the morning and at bedtime For hypertension 180 tablet 1    metoprolol succinate (TOPROL XL) 50 MG extended release tablet Take 1 tablet by mouth daily hypertension 90 tablet 1    rosuvastatin (CRESTOR) 20 MG tablet Take 1 tablet by mouth nightly 90 tablet 1     No current facility-administered medications for this visit.          Family History   Problem Relation Age of Onset    Coronary Art Dis Mother     Heart Failure Mother     Thyroid Disease Mother     Heart Disease Father     Heart Attack Father     Other Sister         adopted    Other Maternal Grandfather         old age          Social Determinants of Health     Tobacco Use: Low Risk     Smoking Tobacco Use: Never    Smokeless Tobacco Use: Never    Passive Exposure: Not on file   Alcohol Use: Not on file   Financial Resource Strain: Low Risk     Difficulty of Paying Living Expenses: Not hard at

## 2023-05-24 NOTE — PATIENT INSTRUCTIONS
SURVEY:    You may be receiving a survey from [employer] regarding your visit today. Please complete the survey to enable us to provide the highest quality of care to you and your family. If you cannot score us a very good on any question, please call the office to discuss how we could have made your experience a very good one. Thank you.   Shivani Stevens [address] [place] [address]  MD Erwin Monahan MD Marshell Gaunt, MD Jadine Roes, DO  Kelsy Leon, APRN-[facility]  Sue No, APRN-[facility]  [address] [facility]  [address], [address], [address], [facility]

## 2023-11-14 ENCOUNTER — OFFICE VISIT (OUTPATIENT)
Dept: PRIMARY CARE CLINIC | Age: 58
End: 2023-11-14
Payer: COMMERCIAL

## 2023-11-14 VITALS
WEIGHT: 201 LBS | HEART RATE: 64 BPM | BODY MASS INDEX: 27.22 KG/M2 | SYSTOLIC BLOOD PRESSURE: 150 MMHG | DIASTOLIC BLOOD PRESSURE: 86 MMHG | HEIGHT: 72 IN | OXYGEN SATURATION: 98 %

## 2023-11-14 DIAGNOSIS — I10 ESSENTIAL HYPERTENSION: ICD-10-CM

## 2023-11-14 DIAGNOSIS — Z13.1 SCREENING FOR DIABETES MELLITUS: ICD-10-CM

## 2023-11-14 DIAGNOSIS — Z12.5 PROSTATE CANCER SCREENING: ICD-10-CM

## 2023-11-14 DIAGNOSIS — Z13.0 SCREENING, ANEMIA, DEFICIENCY, IRON: ICD-10-CM

## 2023-11-14 DIAGNOSIS — Z13.29 SCREENING FOR THYROID DISORDER: ICD-10-CM

## 2023-11-14 DIAGNOSIS — Z00.00 ENCOUNTER FOR WELL ADULT EXAM WITHOUT ABNORMAL FINDINGS: Primary | ICD-10-CM

## 2023-11-14 DIAGNOSIS — R73.01 IFG (IMPAIRED FASTING GLUCOSE): ICD-10-CM

## 2023-11-14 DIAGNOSIS — E78.00 HYPERCHOLESTEROLEMIA: ICD-10-CM

## 2023-11-14 LAB — HBA1C MFR BLD: 6 %

## 2023-11-14 PROCEDURE — 3074F SYST BP LT 130 MM HG: CPT | Performed by: NURSE PRACTITIONER

## 2023-11-14 PROCEDURE — 3078F DIAST BP <80 MM HG: CPT | Performed by: NURSE PRACTITIONER

## 2023-11-14 PROCEDURE — 83036 HEMOGLOBIN GLYCOSYLATED A1C: CPT | Performed by: NURSE PRACTITIONER

## 2023-11-14 PROCEDURE — 99396 PREV VISIT EST AGE 40-64: CPT | Performed by: NURSE PRACTITIONER

## 2023-11-14 RX ORDER — METOPROLOL SUCCINATE 50 MG/1
50 TABLET, EXTENDED RELEASE ORAL DAILY
Qty: 90 TABLET | Refills: 1 | Status: SHIPPED | OUTPATIENT
Start: 2023-11-14

## 2023-11-14 RX ORDER — LOSARTAN POTASSIUM 25 MG/1
25 TABLET ORAL 2 TIMES DAILY
Qty: 180 TABLET | Refills: 1 | Status: SHIPPED | OUTPATIENT
Start: 2023-11-14

## 2023-11-14 NOTE — PROGRESS NOTES
Well Adult Note  Name: Carlos Mclaughlin Date: 2023   MRN: [id number] Sex: Male   Age: 62 y.o. Ethnicity: Non- / Non    : 1965 Race: White (non-)      Herberth Andrea is here for well adult exam.  History:  Monitors BP at home averages 130-140/85. Due for several vaccines, willing to get TDAP at pharmacy. 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 Denies chest pain, headache, eye exam up to date. Hyperlipidemia on statin. Lab Results   Component Value Date    PSA 0.83 2022     Denies any prostate symptoms of LUTS     Review of Systems   Constitutional:  Negative for chills and fever. HENT:  Positive for congestion and sore throat. Eyes: Negative. Respiratory:  Positive for cough. Cardiovascular:  Negative for chest pain. Gastrointestinal:  Negative for abdominal pain and diarrhea. Endocrine: Negative for cold intolerance and heat intolerance. Genitourinary:  Negative for dysuria. Musculoskeletal:  Negative for neck pain. Skin:  Negative for rash. Neurological:  Negative for dizziness and headaches. Psychiatric/Behavioral:  Negative for agitation. The patient is not nervous/anxious. Allergies   Allergen Reactions    Ampicillin          Prior to Visit Medications    Medication Sig Taking?  Authorizing Provider   losartan (COZAAR) 25 MG tablet Take 1 tablet by mouth in the morning and at bedtime For hypertension Yes Prasad Butler APRN - CNP   metoprolol succinate (TOPROL XL) 50 MG extended release tablet Take 1 tablet by mouth daily hypertension Yes JORGE LUIS Mckeon CNP   rosuvastatin (CRESTOR) 20 MG tablet Take 1 tablet by mouth nightly Yes JORGE LUIS Mckeon CNP         Past Medical History:   Diagnosis Date    COVID-19 2021    Hives with onset       Past Surgical History:   Procedure Laterality Date    CYST REMOVAL      neck

## 2023-11-14 NOTE — PATIENT INSTRUCTIONS
Phone: [phone]  F[facility]: [facility] Hours:  Monday: [facility] office location 8-5 ([phone]) Offering additional late hours the first Monday of the month until 7 pm.   Tuesday: 8-5 Wednesday: 8-5 Thursday:  Additional hours offered 2 Thursdays a month. Please call to inquire those dates. Fridays: 7:30-4:30   SURVEY:    You may be receiving a survey from [employer] regarding your visit today. Please complete the survey to enable us to provide the highest quality of care to you and your family. If you cannot score us a very good on any question, please call the office to discuss how we could have made your experience a very good one. Thank you.

## 2023-11-17 ASSESSMENT — ENCOUNTER SYMPTOMS
COUGH: 1
SORE THROAT: 1
DIARRHEA: 0
EYES NEGATIVE: 1
ABDOMINAL PAIN: 0

## 2023-12-01 NOTE — PROGRESS NOTES
[facility]   Preadmission Testing    Name: Unruly Mclaughlin  : 1965  Patient Phone: [phone] (home)     Procedure: COLONOSCOPY  Date of Procedure: 2023    Surgeon: Myra Slater MD    Ht:  6'  Wt: 200lb  Wt method: Stated    Allergies: Allergies   Allergen Reactions    Ampicillin                 There were no vitals filed for this visit. No LMP for male patient. Do you take blood thinners? [] Yes    [x] No         Instructed to stop blood thinners prior to procedure? [] Yes    [] No      [x] N/A   Do you have sleep apnea? [] Yes    [x] No     Do you have acid reflux ? [] Yes    [x] No     Do you have  hiatal hernia? [] Yes    [x] No    Do you ever experience motion sickness? [] Yes    [x] No     Have you had a respiratory infection or sore throat in last 4 weeks before surgery? [] Yes    [x] No     Do you have poorly controlled asthma or COPD? Difficulty with intubation in past? [] Yes    [x] No      [] Yes    [x] No       Do you have a history of angina in the last month or symptomatic arrhythmia? [] Yes    [x] No     Do you have significant central nervous system disease? [] Yes    [x] No     Have you had an EKG, labs, or chest xray in last 12 months? If yes provide copies to anesthesia   [] Yes    [x] No       [] Lab    [] EKG    [] CXR     Have you had a stress test?     [] Yes    [x] No    When/where:    Was it normal?    [] Yes    [] No     Do you or your family have a history of Malignant Hyperthermia? [] Yes    [x] No           Do you smoke? [] Yes    [x] No      Please refrain from smoking on the day of surgery. Patient instructed on: [x] NPO Status   [x] Meds to Take  [x] Ride Home  []No Jewelry/Contact Lenses/Nail [place]  [x] Prep/Lax/Clear Liquids    [] Chlorhexidene     DOS Patient Needs [] HCG   [] Blood Sugar  [] PT/INR    [] T&S       COVID Vaccinated?  [] Yes    [] No                     Patient instructed on the pre-operative,

## 2023-12-03 ENCOUNTER — HOSPITAL ENCOUNTER (OUTPATIENT)
Age: 58
Discharge: HOME | End: 2023-12-03
Payer: COMMERCIAL

## 2023-12-03 DIAGNOSIS — Z13.0 SCREENING, ANEMIA, DEFICIENCY, IRON: ICD-10-CM

## 2023-12-03 DIAGNOSIS — Z12.5 PROSTATE CANCER SCREENING: ICD-10-CM

## 2023-12-03 DIAGNOSIS — Z13.29 SCREENING FOR THYROID DISORDER: ICD-10-CM

## 2023-12-03 DIAGNOSIS — E78.00 HYPERCHOLESTEROLEMIA: ICD-10-CM

## 2023-12-03 DIAGNOSIS — Z00.00 ENCOUNTER FOR WELL ADULT EXAM WITHOUT ABNORMAL FINDINGS: ICD-10-CM

## 2023-12-03 DIAGNOSIS — I10 ESSENTIAL HYPERTENSION: ICD-10-CM

## 2023-12-03 LAB
ALBUMIN SERPL-MCNC: 4.4 G/DL (ref 3.5–5.2)
ALP SERPL-CCNC: 74 U/L (ref 40–129)
ALT SERPL-CCNC: 19 U/L (ref 5–41)
ANION GAP SERPL CALCULATED.3IONS-SCNC: 6 MMOL/L (ref 9–17)
AST SERPL-CCNC: 16 U/L
BASOPHILS # BLD: 0.01 K/UL (ref 0–0.2)
BASOPHILS NFR BLD: 0 % (ref 0–2)
BILIRUB SERPL-MCNC: 0.7 MG/DL (ref 0.3–1.2)
BUN SERPL-MCNC: 25 MG/DL (ref 6–20)
BUN/CREAT SERPL: 25 (ref 9–20)
CALCIUM SERPL-MCNC: 8.9 MG/DL (ref 8.6–10.4)
CHLORIDE SERPL-SCNC: 103 MMOL/L (ref 98–107)
CHOLEST SERPL-MCNC: 140 MG/DL (ref 0–199)
CHOLESTEROL/HDL RATIO: 3
CO2 SERPL-SCNC: 28 MMOL/L (ref 20–31)
CREAT SERPL-MCNC: 1 MG/DL (ref 0.7–1.2)
EOSINOPHIL # BLD: 0.09 K/UL (ref 0–0.4)
EOSINOPHILS RELATIVE PERCENT: 2 % (ref 0–5)
ERYTHROCYTE [DISTWIDTH] IN BLOOD BY AUTOMATED COUNT: 12.2 % (ref 12.1–15.2)
GFR SERPL CREATININE-BSD FRML MDRD: >60 ML/MIN/1.73M2
GLUCOSE SERPL-MCNC: 123 MG/DL (ref 70–99)
HCT VFR BLD AUTO: 45 % (ref 41–53)
HDLC SERPL-MCNC: 47 MG/DL
HGB BLD-MCNC: 15.7 G/DL (ref 13.5–17.5)
IMM GRANULOCYTES # BLD AUTO: 0.01 K/UL (ref 0–0.3)
IMM GRANULOCYTES NFR BLD: 0 % (ref 0–5)
LDLC SERPL CALC-MCNC: 80 MG/DL (ref 0–100)
LYMPHOCYTES NFR BLD: 1.5 K/UL (ref 1–4.8)
LYMPHOCYTES RELATIVE PERCENT: 30 % (ref 13–44)
MCH RBC QN AUTO: 30.3 PG (ref 26–34)
MCHC RBC AUTO-ENTMCNC: 34.9 G/DL (ref 31–37)
MCV RBC AUTO: 86.9 FL (ref 80–100)
MONOCYTES NFR BLD: 0.34 K/UL (ref 0–1)
MONOCYTES NFR BLD: 7 % (ref 5–9)
NEUTROPHILS NFR BLD: 62 % (ref 39–75)
NEUTS SEG NFR BLD: 3.14 K/UL (ref 2.1–6.5)
PLATELET # BLD AUTO: 140 K/UL (ref 140–450)
PMV BLD AUTO: 9.4 FL (ref 6–12)
POTASSIUM SERPL-SCNC: 4.3 MMOL/L (ref 3.7–5.3)
PROT SERPL-MCNC: 6.8 G/DL (ref 6.4–8.3)
PSA SERPL-MCNC: 0.7 NG/ML (ref 0–4)
RBC # BLD AUTO: 5.18 M/UL (ref 4.5–5.9)
SODIUM SERPL-SCNC: 137 MMOL/L (ref 135–144)
TRIGL SERPL-MCNC: 64 MG/DL (ref 0–149)
TSH SERPL DL<=0.05 MIU/L-ACNC: 1.82 UIU/ML (ref 0.3–5)
VLDLC SERPL CALC-MCNC: 13 MG/DL
WBC OTHER # BLD: 5.1 K/UL (ref 3.5–11)

## 2023-12-03 PROCEDURE — 36415 COLL VENOUS BLD VENIPUNCTURE: CPT

## 2023-12-03 PROCEDURE — G0103 PSA SCREENING: HCPCS

## 2023-12-03 PROCEDURE — 84443 ASSAY THYROID STIM HORMONE: CPT

## 2023-12-03 PROCEDURE — 80061 LIPID PANEL: CPT

## 2023-12-03 PROCEDURE — 80053 COMPREHEN METABOLIC PANEL: CPT

## 2023-12-03 PROCEDURE — 85025 COMPLETE CBC W/AUTO DIFF WBC: CPT

## 2023-12-04 ENCOUNTER — HOSPITAL ENCOUNTER (OUTPATIENT)
Age: 58
Discharge: HOME | End: 2023-12-04
Payer: COMMERCIAL

## 2023-12-04 DIAGNOSIS — Z01.818 PREOP TESTING: ICD-10-CM

## 2023-12-04 PROCEDURE — 93005 ELECTROCARDIOGRAM TRACING: CPT | Performed by: INTERNAL MEDICINE

## 2023-12-05 LAB
EKG ATRIAL RATE: 62 BPM
EKG P AXIS: 55 DEGREES
EKG P-R INTERVAL: 162 MS
EKG Q-T INTERVAL: 458 MS
EKG QRS DURATION: 108 MS
EKG QTC CALCULATION (BAZETT): 464 MS
EKG R AXIS: 57 DEGREES
EKG T AXIS: 41 DEGREES
EKG VENTRICULAR RATE: 62 BPM

## 2023-12-05 PROCEDURE — 93010 ELECTROCARDIOGRAM REPORT: CPT | Performed by: INTERNAL MEDICINE

## 2023-12-06 ENCOUNTER — ANESTHESIA EVENT (OUTPATIENT)
Dept: ENDOSCOPY | Age: 58
End: 2023-12-06
Payer: COMMERCIAL

## 2023-12-07 ENCOUNTER — HOSPITAL ENCOUNTER (OUTPATIENT)
Age: 58
Setting detail: OUTPATIENT SURGERY
Discharge: HOME | End: 2023-12-07
Attending: INTERNAL MEDICINE | Admitting: INTERNAL MEDICINE
Payer: COMMERCIAL

## 2023-12-07 ENCOUNTER — ANESTHESIA (OUTPATIENT)
Dept: ENDOSCOPY | Age: 58
End: 2023-12-07
Payer: COMMERCIAL

## 2023-12-07 ENCOUNTER — ANESTHESIA EVENT (OUTPATIENT)
Dept: ENDOSCOPY | Age: 58
End: 2023-12-07
Payer: COMMERCIAL

## 2023-12-07 VITALS
HEART RATE: 84 BPM | OXYGEN SATURATION: 96 % | TEMPERATURE: 97.3 F | DIASTOLIC BLOOD PRESSURE: 91 MMHG | HEIGHT: 72 IN | RESPIRATION RATE: 13 BRPM | BODY MASS INDEX: 26.55 KG/M2 | SYSTOLIC BLOOD PRESSURE: 133 MMHG | WEIGHT: 196 LBS

## 2023-12-07 DIAGNOSIS — Z01.818 PREOP TESTING: Primary | ICD-10-CM

## 2023-12-07 PROCEDURE — 2500000003 HC RX 250 WO HCPCS: Performed by: NURSE ANESTHETIST, CERTIFIED REGISTERED

## 2023-12-07 PROCEDURE — 3609027000 HC COLONOSCOPY: Performed by: INTERNAL MEDICINE

## 2023-12-07 PROCEDURE — 45330 DIAGNOSTIC SIGMOIDOSCOPY: CPT | Performed by: INTERNAL MEDICINE

## 2023-12-07 PROCEDURE — 7100000010 HC PHASE II RECOVERY - FIRST 15 MIN: Performed by: INTERNAL MEDICINE

## 2023-12-07 PROCEDURE — 2709999900 HC NON-CHARGEABLE SUPPLY: Performed by: INTERNAL MEDICINE

## 2023-12-07 PROCEDURE — 2580000003 HC RX 258: Performed by: INTERNAL MEDICINE

## 2023-12-07 PROCEDURE — 6360000002 HC RX W HCPCS: Performed by: NURSE ANESTHETIST, CERTIFIED REGISTERED

## 2023-12-07 PROCEDURE — 7100000011 HC PHASE II RECOVERY - ADDTL 15 MIN: Performed by: INTERNAL MEDICINE

## 2023-12-07 PROCEDURE — 3700000000 HC ANESTHESIA ATTENDED CARE: Performed by: INTERNAL MEDICINE

## 2023-12-07 RX ORDER — SODIUM CHLORIDE, SODIUM LACTATE, POTASSIUM CHLORIDE, CALCIUM CHLORIDE 600; 310; 30; 20 MG/100ML; MG/100ML; MG/100ML; MG/100ML
INJECTION, SOLUTION INTRAVENOUS CONTINUOUS
Status: DISCONTINUED | OUTPATIENT
Start: 2023-12-07 | End: 2023-12-07 | Stop reason: HOSPADM

## 2023-12-07 RX ORDER — MIDAZOLAM HYDROCHLORIDE 1 MG/ML
INJECTION INTRAMUSCULAR; INTRAVENOUS PRN
Status: DISCONTINUED | OUTPATIENT
Start: 2023-12-07 | End: 2023-12-07 | Stop reason: SDUPTHER

## 2023-12-07 RX ORDER — MAGNESIUM HYDROXIDE 1200 MG/15ML
LIQUID ORAL PRN
Status: DISCONTINUED | OUTPATIENT
Start: 2023-12-07 | End: 2023-12-07 | Stop reason: ALTCHOICE

## 2023-12-07 RX ORDER — LIDOCAINE HYDROCHLORIDE 10 MG/ML
INJECTION, SOLUTION EPIDURAL; INFILTRATION; INTRACAUDAL; PERINEURAL PRN
Status: DISCONTINUED | OUTPATIENT
Start: 2023-12-07 | End: 2023-12-07 | Stop reason: SDUPTHER

## 2023-12-07 RX ORDER — PROPOFOL 10 MG/ML
INJECTION, EMULSION INTRAVENOUS PRN
Status: DISCONTINUED | OUTPATIENT
Start: 2023-12-07 | End: 2023-12-07 | Stop reason: SDUPTHER

## 2023-12-07 RX ORDER — FENTANYL CITRATE 50 UG/ML
INJECTION, SOLUTION INTRAMUSCULAR; INTRAVENOUS PRN
Status: DISCONTINUED | OUTPATIENT
Start: 2023-12-07 | End: 2023-12-07 | Stop reason: SDUPTHER

## 2023-12-07 RX ADMIN — LIDOCAINE HYDROCHLORIDE 50 MG: 10 INJECTION, SOLUTION EPIDURAL; INFILTRATION; INTRACAUDAL; PERINEURAL at 09:37

## 2023-12-07 RX ADMIN — MIDAZOLAM 2 MG: 1 INJECTION INTRAMUSCULAR; INTRAVENOUS at 09:28

## 2023-12-07 RX ADMIN — FENTANYL CITRATE 50 MCG: 50 INJECTION, SOLUTION INTRAMUSCULAR; INTRAVENOUS at 09:28

## 2023-12-07 RX ADMIN — FENTANYL CITRATE 50 MCG: 50 INJECTION, SOLUTION INTRAMUSCULAR; INTRAVENOUS at 09:37

## 2023-12-07 RX ADMIN — SODIUM CHLORIDE, POTASSIUM CHLORIDE, SODIUM LACTATE AND CALCIUM CHLORIDE: 600; 310; 30; 20 INJECTION, SOLUTION INTRAVENOUS at 08:36

## 2023-12-07 RX ADMIN — PROPOFOL 50 MG: 10 INJECTION, EMULSION INTRAVENOUS at 09:37

## 2023-12-07 ASSESSMENT — PAIN - FUNCTIONAL ASSESSMENT: PAIN_FUNCTIONAL_ASSESSMENT: NONE - DENIES PAIN

## 2023-12-07 NOTE — H&P
History and Physical    Patient's Name/Date of Birth: Silvia Grossman / 1965 (62 y.o.)    MRN: [id number]     Date: December 7, 2023       CHIEF COMPLAINT:  screening for colon cancer      Silvia Grossman is a 62 y.o. old male who has a past medical history of hypertension, impaired fasting glucose, hypercholesterolemia presenting for his initial colon cancer screening colonoscopy. Family history of colon cancer: No  Blood in stool: No  Unintentional weight loss: No  Abdominal pain: No  Prior colonoscopy: No  Constipation history: No  Number of bowel movements a day: 1  Change in stool caliber: No  History of GERD or Acid Reflux: No  Use of PPI's. No    Past Medical History:   Diagnosis Date    COVID-19 09/27/2021    Hives with onset    Hypertension      Past Surgical History:   Procedure Laterality Date    CYST REMOVAL      neck     HERNIA REPAIR      1970, double hernia     No current facility-administered medications for this encounter. Allergies   Allergen Reactions    Ampicillin      Family History   Problem Relation Age of Onset    Coronary Art Dis Mother     Heart Failure Mother     Thyroid Disease Mother     Heart Disease Father     Heart Attack Father     Other Sister         adopted    Other Maternal Grandfather         old age     Social History     Socioeconomic History    Marital status:      Spouse name: Not on file    Number of children: Not on file    Years of education: Not on file    Highest education level: Not on file   Occupational History    Not on file   Tobacco Use    Smoking status: Never    Smokeless tobacco: Never   Vaping Use    Vaping Use: Never used   Substance and Sexual Activity    Alcohol use: Yes     Comment: occas.     Drug use: Never    Sexual activity: Not on file   Other Topics Concern    Not on file   Social History Narrative    Not on file     Social Determinants of Health     Financial Resource Strain: Low Risk  (11/9/2022)    Overall Financial Resource

## 2023-12-07 NOTE — ANESTHESIA POSTPROCEDURE EVALUATION
Department of Anesthesiology  Postprocedure Note    Patient: Giulia Mcgarry  MRN: [id number]  YOB: 1965  Date of evaluation: 12/7/2023      Procedure Summary       Date: 12/07/23 Room / Location: [facility]    Anesthesia Start: Nohemy  Anesthesia Stop: 0600    Procedure: COLONOSCOPY (Abdomen) Diagnosis:       Special screening for malignant neoplasms, colon      (Special screening for malignant neoplasms, colon [Z12.11])    Surgeons: Hipolito Pendleton MD Responsible Provider: JORGE LUIS Benavides CRNA    Anesthesia Type: MAC ASA Status: 2            Anesthesia Type: No value filed.     Tristan Phase I: Tristan Score: 10    Tristan Phase II:        Anesthesia Post Evaluation    Patient location during evaluation: PACU  Patient participation: complete - patient participated  Level of consciousness: awake and lethargic  Pain score: 0  Airway patency: patent  Nausea & Vomiting: no nausea and no vomiting  Complications: no  Cardiovascular status: hemodynamically stable  Respiratory status: acceptable, room air and spontaneous ventilation  Hydration status: euvolemic  Multimodal analgesia pain management approach  Pain management: adequate and satisfactory to patient

## 2023-12-07 NOTE — H&P (VIEW-ONLY)
History and Physical    Patient's Name/Date of Birth: Joe Duke / 1965 (62 y.o.)    MRN: [id number]     Date: December 7, 2023       CHIEF COMPLAINT:  screening for colon cancer      Joe Duke is a 62 y.o. old male who has a past medical history of hypertension, impaired fasting glucose, hypercholesterolemia presenting for his initial colon cancer screening colonoscopy. Family history of colon cancer: No  Blood in stool: No  Unintentional weight loss: No  Abdominal pain: No  Prior colonoscopy: No  Constipation history: No  Number of bowel movements a day: 1  Change in stool caliber: No  History of GERD or Acid Reflux: No  Use of PPI's. No    Past Medical History:   Diagnosis Date    COVID-19 09/27/2021    Hives with onset    Hypertension      Past Surgical History:   Procedure Laterality Date    CYST REMOVAL      neck     HERNIA REPAIR      1970, double hernia     No current facility-administered medications for this encounter. Allergies   Allergen Reactions    Ampicillin      Family History   Problem Relation Age of Onset    Coronary Art Dis Mother     Heart Failure Mother     Thyroid Disease Mother     Heart Disease Father     Heart Attack Father     Other Sister         adopted    Other Maternal Grandfather         old age     Social History     Socioeconomic History    Marital status:      Spouse name: Not on file    Number of children: Not on file    Years of education: Not on file    Highest education level: Not on file   Occupational History    Not on file   Tobacco Use    Smoking status: Never    Smokeless tobacco: Never   Vaping Use    Vaping Use: Never used   Substance and Sexual Activity    Alcohol use: Yes     Comment: occas.     Drug use: Never    Sexual activity: Not on file   Other Topics Concern    Not on file   Social History Narrative    Not on file     Social Determinants of Health     Financial Resource Strain: Low Risk  (11/9/2022)    Overall Financial Resource

## 2023-12-07 NOTE — PROGRESS NOTES

## 2023-12-07 NOTE — ANESTHESIA PRE PROCEDURE
Applicable):  No results found for: \"LABABO\", \"LABRH\"    Drug/Infectious Status (If Applicable):  No results found for: \"HIV\", \"HEPCAB\"    COVID-19 Screening (If Applicable):   Lab Results   Component Value Date/Time    COVID19 DETECTED 09/27/2021 04:48 AM           Anesthesia Evaluation  Patient summary reviewed and Nursing notes reviewed  Airway: Mallampati: II  TM distance: >3 FB   Neck ROM: full  Mouth opening: > = 3 FB   Dental: normal exam         Pulmonary:Negative Pulmonary ROS and normal exam  breath sounds clear to auscultation                             Cardiovascular:  Exercise tolerance: good (>4 METS)  (+) hypertension:, hyperlipidemia      ECG reviewed  Rhythm: regular  Rate: normal                    Neuro/Psych:   Negative Neuro/Psych ROS              GI/Hepatic/Renal:   (+) bowel prep          Endo/Other: Negative Endo/Other ROS                    Abdominal: normal exam      Abdomen: soft. Vascular: negative vascular ROS. Other Findings:             Anesthesia Plan      MAC     ASA 2       Induction: intravenous. Anesthetic plan and risks discussed with patient. Plan discussed with attending.                     Anthony Gonzales, JORGE LUIS - CRNA   12/8/2023

## 2023-12-07 NOTE — OP NOTE
[facility]    COLONOSCOPY    PROCEDURE DATE: 12/07/23    REFERRING PHYSICIAN: No ref. provider found     PRIMARY CARE PROVIDER: JORGE LUIS Gotti - CNP    ATTENDING PHYSICIAN: Justina Tillman MD     HISTORY: Mr. Flavio Campos is a 62 y.o. male who presents to the  endoscopy unit for colonoscopy. The patient's clinical history is remarkable for hypertension. He is currently medically stable and appropriate for the planned procedure. PREOPERATIVE DIAGNOSIS: screening for colon cancer. PROCEDURES:   Transanal Colonoscopy --screening. POSTPROCEDURE DIAGNOSIS:  Poor prep    MEDICATIONS:   MAC per anesthesia      EBL: None      INSTRUMENT: Olympus CF-H190 AL Pediatric flexible Colonoscope. PREPARATION: The nature and character of the procedure as well as risks, benefits, and alternatives were discussed with the patient and informed consent was obtained. Complications were said to include, but were not limited to: medication allergy, medication reaction, cardiovascular and respiratory problems, bleeding, perforation, infection, and/or missed diagnosis. Following arrival in the endoscopy room, the patient was placed in the left lateral decubitus position and final time-out accomplished in the presence of the nursing staff. Baseline vital signs were obtained and reviewed, and IV sedation was subsequently initiated. FINDINGS: Rectal examination demonstrated no significant visible external abnormality and digital palpation was unremarkable. Following adequate conscious sedation the colonoscope was introduced and advanced under direct visualization to the sigmoid. The bowel preparation was felt to be poor. This included copious amounts of thick, brown formed  stool - the procedure was aborted. Retroflexion was performed in the rectum and no mass was noted. IMPRESSION:   Poor prep     RECOMMENDATIONS:   1) Follow up with referring provider, as previously scheduled.    2) Repeat

## 2023-12-08 ENCOUNTER — ANESTHESIA (OUTPATIENT)
Dept: ENDOSCOPY | Age: 58
End: 2023-12-08
Payer: COMMERCIAL

## 2023-12-08 ENCOUNTER — HOSPITAL ENCOUNTER (OUTPATIENT)
Age: 58
Setting detail: OUTPATIENT SURGERY
Discharge: HOME | End: 2023-12-08
Attending: INTERNAL MEDICINE | Admitting: INTERNAL MEDICINE
Payer: COMMERCIAL

## 2023-12-08 VITALS
BODY MASS INDEX: 26.3 KG/M2 | TEMPERATURE: 97.5 F | SYSTOLIC BLOOD PRESSURE: 130 MMHG | OXYGEN SATURATION: 97 % | RESPIRATION RATE: 17 BRPM | HEIGHT: 72 IN | DIASTOLIC BLOOD PRESSURE: 88 MMHG | WEIGHT: 194.2 LBS | HEART RATE: 82 BPM

## 2023-12-08 DIAGNOSIS — Z12.11 SPECIAL SCREENING FOR MALIGNANT NEOPLASMS, COLON: ICD-10-CM

## 2023-12-08 PROCEDURE — 45380 COLONOSCOPY AND BIOPSY: CPT | Performed by: INTERNAL MEDICINE

## 2023-12-08 PROCEDURE — 6360000002 HC RX W HCPCS: Performed by: NURSE ANESTHETIST, CERTIFIED REGISTERED

## 2023-12-08 PROCEDURE — 2580000003 HC RX 258: Performed by: INTERNAL MEDICINE

## 2023-12-08 PROCEDURE — 7100000011 HC PHASE II RECOVERY - ADDTL 15 MIN: Performed by: INTERNAL MEDICINE

## 2023-12-08 PROCEDURE — 3609010300 HC COLONOSCOPY W/BIOPSY SINGLE/MULTIPLE: Performed by: INTERNAL MEDICINE

## 2023-12-08 PROCEDURE — 7100000010 HC PHASE II RECOVERY - FIRST 15 MIN: Performed by: INTERNAL MEDICINE

## 2023-12-08 PROCEDURE — 3700000000 HC ANESTHESIA ATTENDED CARE: Performed by: INTERNAL MEDICINE

## 2023-12-08 PROCEDURE — 6370000000 HC RX 637 (ALT 250 FOR IP): Performed by: INTERNAL MEDICINE

## 2023-12-08 PROCEDURE — 2500000003 HC RX 250 WO HCPCS: Performed by: NURSE ANESTHETIST, CERTIFIED REGISTERED

## 2023-12-08 PROCEDURE — 2709999900 HC NON-CHARGEABLE SUPPLY: Performed by: INTERNAL MEDICINE

## 2023-12-08 PROCEDURE — 88305 TISSUE EXAM BY PATHOLOGIST: CPT

## 2023-12-08 PROCEDURE — 3700000001 HC ADD 15 MINUTES (ANESTHESIA): Performed by: INTERNAL MEDICINE

## 2023-12-08 RX ORDER — SODIUM CHLORIDE, SODIUM LACTATE, POTASSIUM CHLORIDE, CALCIUM CHLORIDE 600; 310; 30; 20 MG/100ML; MG/100ML; MG/100ML; MG/100ML
INJECTION, SOLUTION INTRAVENOUS CONTINUOUS
Status: DISCONTINUED | OUTPATIENT
Start: 2023-12-08 | End: 2023-12-08 | Stop reason: HOSPADM

## 2023-12-08 RX ORDER — PROPOFOL 10 MG/ML
INJECTION, EMULSION INTRAVENOUS CONTINUOUS PRN
Status: DISCONTINUED | OUTPATIENT
Start: 2023-12-08 | End: 2023-12-08 | Stop reason: SDUPTHER

## 2023-12-08 RX ORDER — SIMETHICONE 20 MG/.3ML
EMULSION ORAL PRN
Status: DISCONTINUED | OUTPATIENT
Start: 2023-12-08 | End: 2023-12-08 | Stop reason: ALTCHOICE

## 2023-12-08 RX ORDER — FENTANYL CITRATE 50 UG/ML
INJECTION, SOLUTION INTRAMUSCULAR; INTRAVENOUS PRN
Status: DISCONTINUED | OUTPATIENT
Start: 2023-12-08 | End: 2023-12-08 | Stop reason: SDUPTHER

## 2023-12-08 RX ORDER — PROPOFOL 10 MG/ML
INJECTION, EMULSION INTRAVENOUS PRN
Status: DISCONTINUED | OUTPATIENT
Start: 2023-12-08 | End: 2023-12-08 | Stop reason: SDUPTHER

## 2023-12-08 RX ORDER — LIDOCAINE HYDROCHLORIDE 10 MG/ML
INJECTION, SOLUTION EPIDURAL; INFILTRATION; INTRACAUDAL; PERINEURAL PRN
Status: DISCONTINUED | OUTPATIENT
Start: 2023-12-08 | End: 2023-12-08 | Stop reason: SDUPTHER

## 2023-12-08 RX ORDER — MIDAZOLAM HYDROCHLORIDE 1 MG/ML
INJECTION INTRAMUSCULAR; INTRAVENOUS PRN
Status: DISCONTINUED | OUTPATIENT
Start: 2023-12-08 | End: 2023-12-08 | Stop reason: SDUPTHER

## 2023-12-08 RX ORDER — MAGNESIUM HYDROXIDE 1200 MG/15ML
LIQUID ORAL PRN
Status: DISCONTINUED | OUTPATIENT
Start: 2023-12-08 | End: 2023-12-08 | Stop reason: ALTCHOICE

## 2023-12-08 RX ADMIN — PROPOFOL 20 MG: 10 INJECTION, EMULSION INTRAVENOUS at 08:22

## 2023-12-08 RX ADMIN — FENTANYL CITRATE 50 MCG: 50 INJECTION, SOLUTION INTRAMUSCULAR; INTRAVENOUS at 08:19

## 2023-12-08 RX ADMIN — SODIUM CHLORIDE, POTASSIUM CHLORIDE, SODIUM LACTATE AND CALCIUM CHLORIDE: 600; 310; 30; 20 INJECTION, SOLUTION INTRAVENOUS at 06:47

## 2023-12-08 RX ADMIN — LIDOCAINE HYDROCHLORIDE 50 MG: 10 INJECTION, SOLUTION EPIDURAL; INFILTRATION; INTRACAUDAL; PERINEURAL at 08:19

## 2023-12-08 RX ADMIN — PROPOFOL 50 MG: 10 INJECTION, EMULSION INTRAVENOUS at 08:19

## 2023-12-08 RX ADMIN — MIDAZOLAM 2 MG: 1 INJECTION INTRAMUSCULAR; INTRAVENOUS at 08:14

## 2023-12-08 RX ADMIN — PROPOFOL 100 MCG/KG/MIN: 10 INJECTION, EMULSION INTRAVENOUS at 08:19

## 2023-12-08 RX ADMIN — FENTANYL CITRATE 50 MCG: 50 INJECTION, SOLUTION INTRAMUSCULAR; INTRAVENOUS at 08:14

## 2023-12-08 ASSESSMENT — PAIN - FUNCTIONAL ASSESSMENT: PAIN_FUNCTIONAL_ASSESSMENT: NONE - DENIES PAIN

## 2023-12-08 NOTE — DISCHARGE INSTRUCTIONS
Discharge Instructions for Colonoscopy     Colonoscopy is a visual exam of the lining of the large intestine, also called the bowel or colon, with a colonoscope. A colonoscope is a flexible tube with a light and a viewing device. It allows the doctor to view the inside of the colon through a tiny video camera. Colonoscopy is performed for many reasons: unexplained anemia , pain, diarrhea , bloody stools, cancer screening, among many other reasons. Complications from a colonoscopy are rare. Some possible serious complications include perforated bowel (which might require surgery) and bleeding (which could require blood transfusion ). Minor complications include bloating, gas, and cramping that can last for 1-2 days after the procedure. Because air is put into your colon during the procedure, it is normal to pass large amounts of air from your rectum. You may not have a bowel movement for 1-3 days after the procedure. What You Will Need    Someone to drive you home after the procedure   Steps to [facility] when you get home. Because the sedative will make you drowsy, don't drive, operate machinery, or make important decisions the day of the procedure. Feelings of bloating, gas, or cramping may persist for 24 hours. Diet     Try sips of water first. If tolerated, resume regular diet or the diet recommended by your physician. Do not drink alcohol for 24 hours. Physical Activity     Ask your doctor when you will be able to return to work. Do not drive, operate heavy machinery, or do activities that require coordination or balance for 24 hours. Otherwise, return to your normal routine as soon as you are comfortable to do so, which is usually the next day after the procedure. Medications    When taking medications, it's important to: Take your medication as directednot more, not less, not at a different time.     Do not stop taking them without consulting your

## 2023-12-08 NOTE — OP NOTE
[facility]    COLONOSCOPY    PROCEDURE DATE: 12/08/23    REFERRING PHYSICIAN: No ref. provider found     PRIMARY CARE PROVIDER: JORGE LUIS Gotti CNP    ATTENDING PHYSICIAN: Justina Tillman MD     HISTORY: Mr. Flavio Campos is a 62 y.o. male who presents to the  endoscopy unit for colonoscopy. The patient's clinical history is remarkable for Hypertension. He is currently medically stable and appropriate for the planned procedure. PREOPERATIVE DIAGNOSIS: screening for colon cancer. PROCEDURES:   Transanal Colonoscopy --screening. POSTPROCEDURE DIAGNOSIS:  Colon polyps    MEDICATIONS:   MAC per anesthesia     EBL 2 ml      INSTRUMENT: Olympus CF-H190 AL Pediatric flexible Colonoscope. PREPARATION: The nature and character of the procedure as well as risks, benefits, and alternatives were discussed with the patient and informed consent was obtained. Complications were said to include, but were not limited to: medication allergy, medication reaction, cardiovascular and respiratory problems, bleeding, perforation, infection, and/or missed diagnosis. Following arrival in the endoscopy room, the patient was placed in the left lateral decubitus position and final time-out accomplished in the presence of the nursing staff. Baseline vital signs were obtained and reviewed, and IV sedation was subsequently initiated. FINDINGS: Rectal examination demonstrated no significant visible external abnormality and digital palpation was unremarkable. Following adequate conscious sedation the colonoscope was introduced and advanced under direct visualization to the cecum, which was identified by the ileocecal valve and appendiceal orifice. The bowel preparation was felt to be adequate after scope powered irrigation and suction. Cecal intubation time was 5 minutes. Once maximally inserted, the endoscope was withdrawn, fluid as well as gas was suctioned and the mucosa was carefully inspected.

## 2023-12-08 NOTE — ANESTHESIA POSTPROCEDURE EVALUATION
Department of Anesthesiology  Postprocedure Note    Patient: Bailey Dos Santos  MRN: [id number]  YOB: 1965  Date of evaluation: 12/8/2023      Procedure Summary       Date: 12/08/23 Room / Location: [address] [place] [address] / [address] [place] [address] ENDOSCOPY    Anesthesia Start: 8756 Anesthesia Stop: 4019    Procedure: COLONOSCOPY (Abdomen) Diagnosis:       Special screening for malignant neoplasms, colon      (Special screening for malignant neoplasms, colon [Z12.11])    Surgeons: Silvia Zarate MD Responsible Provider: JORGE LUIS Daniel CRNA    Anesthesia Type: MAC ASA Status: 2            Anesthesia Type: No value filed.     Tristan Phase I: Tristan Score: 10    Tristan Phase II:        Anesthesia Post Evaluation    Patient location during evaluation: PACU  Patient participation: complete - patient participated  Level of consciousness: awake and lethargic  Pain score: 0  Airway patency: patent  Nausea & Vomiting: no nausea and no vomiting  Complications: no  Cardiovascular status: hemodynamically stable  Respiratory status: acceptable, room air and spontaneous ventilation  Hydration status: euvolemic  Multimodal analgesia pain management approach  Pain management: adequate and satisfactory to patient

## 2023-12-08 NOTE — INTERVAL H&P NOTE
Update History & Physical    The patient's History and Physical of December 7, 2023 was reviewed with the patient and I examined the patient. There was no change. The surgical site was confirmed by the patient and me. Plan: The risks, benefits, expected outcome, and alternative to the recommended procedure have been discussed with the patient. Patient understands and wants to proceed with the procedure.      Electronically signed by Laura Sharp MD on 12/8/2023 at 8:09 AM

## 2023-12-11 LAB — SURGICAL PATHOLOGY REPORT: NORMAL

## 2024-01-09 ENCOUNTER — TELEPHONE (OUTPATIENT)
Dept: GASTROENTEROLOGY | Age: 59
End: 2024-01-09

## 2024-01-09 NOTE — TELEPHONE ENCOUNTER
----- Message from Risa Araujo MD sent at 1/7/2024 12:48 AM EST -----  Please notify patient: Hyperplastic polyps are polyps which are not on a pre-cancerous spectrum. Repeat interval for colonoscopy is 7-10 years.

## 2024-03-11 DIAGNOSIS — I10 ESSENTIAL HYPERTENSION: ICD-10-CM

## 2024-03-11 DIAGNOSIS — E78.00 HYPERCHOLESTEROLEMIA: ICD-10-CM

## 2024-03-11 RX ORDER — LOSARTAN POTASSIUM 25 MG/1
25 TABLET ORAL 2 TIMES DAILY
Qty: 180 TABLET | Refills: 1 | Status: SHIPPED | OUTPATIENT
Start: 2024-03-11

## 2024-03-11 RX ORDER — METOPROLOL SUCCINATE 50 MG/1
50 TABLET, EXTENDED RELEASE ORAL DAILY
Qty: 90 TABLET | Refills: 1 | Status: SHIPPED | OUTPATIENT
Start: 2024-03-11

## 2024-03-11 RX ORDER — ROSUVASTATIN CALCIUM 20 MG/1
20 TABLET, COATED ORAL NIGHTLY
Qty: 90 TABLET | Refills: 1 | Status: SHIPPED | OUTPATIENT
Start: 2024-03-11

## 2024-03-11 NOTE — TELEPHONE ENCOUNTER
Last OV: 11/14/2023  Last RX: 11/14/2023, 12/18/2023   Next scheduled apt: Visit date not found      Surescripts requesting refill

## 2024-09-22 DIAGNOSIS — I10 ESSENTIAL HYPERTENSION: ICD-10-CM

## 2024-09-23 RX ORDER — METOPROLOL SUCCINATE 50 MG/1
TABLET, EXTENDED RELEASE ORAL
Qty: 90 TABLET | Refills: 3 | Status: SHIPPED | OUTPATIENT
Start: 2024-09-23

## 2025-01-24 ENCOUNTER — APPOINTMENT (OUTPATIENT)
Dept: GENERAL RADIOLOGY | Age: 60
End: 2025-01-24
Payer: COMMERCIAL

## 2025-01-24 ENCOUNTER — HOSPITAL ENCOUNTER (EMERGENCY)
Age: 60
Discharge: HOME | End: 2025-01-24
Attending: FAMILY MEDICINE
Payer: COMMERCIAL

## 2025-01-24 VITALS
RESPIRATION RATE: 18 BRPM | WEIGHT: 200 LBS | BODY MASS INDEX: 27.09 KG/M2 | HEART RATE: 77 BPM | TEMPERATURE: 98.6 F | DIASTOLIC BLOOD PRESSURE: 110 MMHG | OXYGEN SATURATION: 96 % | SYSTOLIC BLOOD PRESSURE: 188 MMHG | HEIGHT: 72 IN

## 2025-01-24 DIAGNOSIS — S61.239A PUNCTURE WOUND OF FINGER OF RIGHT HAND, INITIAL ENCOUNTER: Primary | ICD-10-CM

## 2025-01-24 DIAGNOSIS — Z23 TETANUS-DIPHTHERIA VACCINATION ADMINISTERED AT CURRENT VISIT: ICD-10-CM

## 2025-01-24 PROCEDURE — 73140 X-RAY EXAM OF FINGER(S): CPT

## 2025-01-24 PROCEDURE — 6360000002 HC RX W HCPCS: Performed by: FAMILY MEDICINE

## 2025-01-24 PROCEDURE — 90471 IMMUNIZATION ADMIN: CPT | Performed by: FAMILY MEDICINE

## 2025-01-24 PROCEDURE — 90714 TD VACC NO PRESV 7 YRS+ IM: CPT | Performed by: FAMILY MEDICINE

## 2025-01-24 PROCEDURE — 99284 EMERGENCY DEPT VISIT MOD MDM: CPT

## 2025-01-24 RX ORDER — SULFAMETHOXAZOLE AND TRIMETHOPRIM 800; 160 MG/1; MG/1
1 TABLET ORAL 2 TIMES DAILY
Qty: 20 TABLET | Refills: 0 | Status: SHIPPED | OUTPATIENT
Start: 2025-01-24 | End: 2025-02-03

## 2025-01-24 RX ORDER — CEPHALEXIN 500 MG/1
500 CAPSULE ORAL 4 TIMES DAILY
Qty: 40 CAPSULE | Refills: 0 | Status: SHIPPED | OUTPATIENT
Start: 2025-01-24

## 2025-01-24 RX ADMIN — CLOSTRIDIUM TETANI TOXOID ANTIGEN (FORMALDEHYDE INACTIVATED) AND CORYNEBACTERIUM DIPHTHERIAE TOXOID ANTIGEN (FORMALDEHYDE INACTIVATED) 0.5 ML: 5; 2 INJECTION, SUSPENSION INTRAMUSCULAR at 13:11

## 2025-01-24 ASSESSMENT — PAIN DESCRIPTION - DESCRIPTORS: DESCRIPTORS: THROBBING

## 2025-01-24 ASSESSMENT — PAIN DESCRIPTION - ORIENTATION: ORIENTATION: RIGHT

## 2025-01-24 ASSESSMENT — PAIN DESCRIPTION - FREQUENCY: FREQUENCY: CONTINUOUS

## 2025-01-24 ASSESSMENT — LIFESTYLE VARIABLES
HOW OFTEN DO YOU HAVE A DRINK CONTAINING ALCOHOL: NEVER
HOW MANY STANDARD DRINKS CONTAINING ALCOHOL DO YOU HAVE ON A TYPICAL DAY: PATIENT DOES NOT DRINK

## 2025-01-24 ASSESSMENT — PAIN DESCRIPTION - LOCATION: LOCATION: FINGER (COMMENT WHICH ONE)

## 2025-01-24 ASSESSMENT — PAIN SCALES - GENERAL: PAINLEVEL_OUTOF10: 4

## 2025-01-24 ASSESSMENT — PAIN DESCRIPTION - PAIN TYPE: TYPE: ACUTE PAIN

## 2025-01-24 NOTE — DISCHARGE INSTRUCTIONS
Information on pulp space infections included in discharge paperwork for information purposes only.

## 2025-01-25 NOTE — ED PROVIDER NOTES
[facility]  EMERGENCY DEPARTMENT ENCOUNTER      Pt Name: Miguel Valdes  MRN: [id number]  Birthdate 1965  Date of evaluation: 1/24/2025  Provider: Joe Marino MD    CHIEF COMPLAINT       Chief Complaint   Patient presents with    Hand Injury     Right middle finger caught in machine at work. Crush injury. Works at [employer]. Not up to date on tetanus.         HISTORY OF PRESENT ILLNESS      Miguel Valdes is a 60 y.o. male who presents to the emergency department via private vehicle, patient's right third finger got caught in a machine at work, indicates he was trying will apart out when he came down and punctured the his finger indicating from the radial lateral side the distal portion of the finger.  Patient has been able to move the finger, albeit having some discomfort.  Denies other injury.  Patient unsure of last tetanus shot.        REVIEW OF SYSTEMS       Review of Systems   All other systems reviewed and are negative.        PAST MEDICAL HISTORY     Past Medical History:   Diagnosis Date    COVID-19 09/27/2021    Hives with onset    Hypertension          SURGICAL HISTORY       Past Surgical History:   Procedure Laterality Date    COLONOSCOPY N/A 12/7/2023    COLONOSCOPY performed by Risa Araujo MD at [facility] ENDOSCOPY    COLONOSCOPY N/A 12/8/2023    COLONOSCOPY performed by Risa Araujo MD at [facility] ENDOSCOPY    CYST REMOVAL      neck     HERNIA REPAIR      1970, double hernia         CURRENT MEDICATIONS       Discharge Medication List as of 1/24/2025  1:30 PM        CONTINUE these medications which have NOT CHANGED    Details   metoprolol succinate (TOPROL XL) 50 MG extended release tablet TAKE 1 TABLET DAILY FOR HYPERTENSION, Disp-90 tablet, R-3Normal      losartan (COZAAR) 25 MG tablet Take 1 tablet by mouth in the morning and at bedtime For hypertension, Disp-180 tablet, R-1Normal      rosuvastatin (CRESTOR) 20 MG tablet Take 1 tablet by mouth nightly, Disp-90 tablet,  Disp-20 tablet, R-0Normal      cephALEXin (KEFLEX) 500 MG capsule Take 1 capsule by mouth 4 times daily, Disp-40 capsule, R-0Normal             (Please note that portions of this note were completed with a voice recognition program.  Efforts were made to edit the dictations but occasionally words are mis-transcribed.)    Joe Marino MD (electronically signed)  Attending Emergency Physician           Joe Marino MD  01/25/25 9587     no

## 2025-01-29 ENCOUNTER — TELEPHONE (OUTPATIENT)
Dept: PRIMARY CARE CLINIC | Age: 60
End: 2025-01-29

## 2025-01-29 NOTE — TELEPHONE ENCOUNTER
[facility] ED Follow up Call    Reason for ED visit: Puncture wound of finger, right hand        1/29/2025     Hi, this message is for Miguel Valdes.  This is Lisa Simpson LPN from Arlette KANGCNP office.  Just calling to see how you are doing after your recent visit to the Emergency Room. Arlette KANG-CNP wants to make sure you were able to fill any prescriptions and that you understand your discharge instructions.  Please return our call if you need to make a follow up appointment with your provider or have any further needs.   Our phone number is [phone].  Have a great day!

## 2025-02-17 DIAGNOSIS — Z12.5 PROSTATE CANCER SCREENING: Primary | ICD-10-CM

## 2025-02-17 DIAGNOSIS — I10 ESSENTIAL HYPERTENSION: ICD-10-CM

## 2025-02-17 DIAGNOSIS — Z13.1 SCREENING FOR DIABETES MELLITUS: ICD-10-CM

## 2025-02-17 DIAGNOSIS — E78.00 HYPERCHOLESTEROLEMIA: ICD-10-CM

## 2025-02-18 RX ORDER — LOSARTAN POTASSIUM 25 MG/1
TABLET ORAL
Qty: 180 TABLET | Refills: 0 | Status: SHIPPED | OUTPATIENT
Start: 2025-02-18

## 2025-02-18 RX ORDER — ROSUVASTATIN CALCIUM 20 MG/1
20 TABLET, COATED ORAL NIGHTLY
Qty: 90 TABLET | Refills: 0 | Status: SHIPPED | OUTPATIENT
Start: 2025-02-18

## 2025-02-18 NOTE — TELEPHONE ENCOUNTER
Last OV: 11/14/2023  Last RX: 3/11/2024   Next scheduled apt: Visit date not found    Surescripts requesting refill

## 2025-02-19 NOTE — TELEPHONE ENCOUNTER
Inform pt it has been over a year since last OV and labs. Schedule physical labs ordered please complete.

## 2025-02-28 ASSESSMENT — PATIENT HEALTH QUESTIONNAIRE - PHQ9
SUM OF ALL RESPONSES TO PHQ9 QUESTIONS 1 & 2: 0
1. LITTLE INTEREST OR PLEASURE IN DOING THINGS: NOT AT ALL
1. LITTLE INTEREST OR PLEASURE IN DOING THINGS: NOT AT ALL
2. FEELING DOWN, DEPRESSED OR HOPELESS: NOT AT ALL
2. FEELING DOWN, DEPRESSED OR HOPELESS: NOT AT ALL
SUM OF ALL RESPONSES TO PHQ QUESTIONS 1-9: 0

## 2025-03-04 ENCOUNTER — OFFICE VISIT (OUTPATIENT)
Dept: PRIMARY CARE CLINIC | Age: 60
End: 2025-03-04
Payer: COMMERCIAL

## 2025-03-04 VITALS
SYSTOLIC BLOOD PRESSURE: 140 MMHG | HEIGHT: 72 IN | BODY MASS INDEX: 27.63 KG/M2 | WEIGHT: 204 LBS | HEART RATE: 75 BPM | OXYGEN SATURATION: 98 % | DIASTOLIC BLOOD PRESSURE: 85 MMHG

## 2025-03-04 DIAGNOSIS — Z71.85 VACCINE COUNSELING: ICD-10-CM

## 2025-03-04 DIAGNOSIS — E78.00 HYPERCHOLESTEROLEMIA: ICD-10-CM

## 2025-03-04 DIAGNOSIS — Z00.00 ENCOUNTER FOR WELL ADULT EXAM WITHOUT ABNORMAL FINDINGS: Primary | ICD-10-CM

## 2025-03-04 DIAGNOSIS — I10 ESSENTIAL HYPERTENSION: ICD-10-CM

## 2025-03-04 PROCEDURE — 3077F SYST BP >= 140 MM HG: CPT | Performed by: NURSE PRACTITIONER

## 2025-03-04 PROCEDURE — 3079F DIAST BP 80-89 MM HG: CPT | Performed by: NURSE PRACTITIONER

## 2025-03-04 PROCEDURE — 99396 PREV VISIT EST AGE 40-64: CPT | Performed by: NURSE PRACTITIONER

## 2025-03-04 RX ORDER — DOXYCYCLINE HYCLATE 100 MG
100 TABLET ORAL 2 TIMES DAILY
Qty: 14 TABLET | Refills: 0 | Status: SHIPPED | OUTPATIENT
Start: 2025-03-04 | End: 2025-03-11

## 2025-03-04 SDOH — ECONOMIC STABILITY: FOOD INSECURITY: WITHIN THE PAST 12 MONTHS, YOU WORRIED THAT YOUR FOOD WOULD RUN OUT BEFORE YOU GOT MONEY TO BUY MORE.: NEVER TRUE

## 2025-03-04 SDOH — ECONOMIC STABILITY: FOOD INSECURITY: WITHIN THE PAST 12 MONTHS, THE FOOD YOU BOUGHT JUST DIDN'T LAST AND YOU DIDN'T HAVE MONEY TO GET MORE.: NEVER TRUE

## 2025-03-04 ASSESSMENT — ENCOUNTER SYMPTOMS
CHEST TIGHTNESS: 0
DIARRHEA: 0
ABDOMINAL DISTENTION: 0
EYES NEGATIVE: 1
RHINORRHEA: 0
COUGH: 0
CONSTIPATION: 0

## 2025-03-04 NOTE — PATIENT INSTRUCTIONS
THANK YOU FOR TRUSTING US WITH YOUR HEALTHCARE !!    The associates caring for you today were:    Family Practice Provider: Arlette KANG-TU    Clinical Team Nurse: Lisa Simpson LPN    Clinical Team Medical Assistant: Luisa Stover MA    Our goal is to provide you with EXCEPTIONAL patient care, and we strive to do this for EVERY patient, EVERY visit. Since we care about you and your experience, you may receive a patient satisfaction survey from [employer] by postal mail/email/text. We truly welcome your feedback and ask that you complete the survey to let us know how we are doing.    Important Numbers:  [facility] phone [phone]   [place] Office Nurse/MA Line: [phone]  Fax  [phone]   Central [phone]  Billing questions: [phone]  Medical Records Request: [phone]    Manage your healthcare  with [facility] My[facility]. To activate your account, visit [email or web address]   DIGITAL scheduling available now through my chart.  Schedule your next appointment at your convenience through your my chart.       [place] Office Hours:  Monday: [place] office location 8-5 ([phone]) Offering additional late hours the first Monday of the month until 7 pm.   Tuesday: 8: :  812 Noon, closed  of the month   : 7:30-4:30        Well Visit, Ages 18 to 65: Care Instructions  Well visits can help you stay healthy. Your doctor has checked your overall health and may have suggested ways to take good care of yourself. Your doctor also may have recommended tests. You can help prevent illness with healthy eating, good sleep, vaccinations, regular exercise, and other steps.    Get the tests that you and your doctor decide on. Depending on your age and risks, examples might include screening for diabetes; hepatitis C; HIV; and cervical, breast, lung, and colon cancer.

## 2025-03-08 ENCOUNTER — HOSPITAL ENCOUNTER (OUTPATIENT)
Age: 60
Discharge: HOME | End: 2025-03-08
Payer: COMMERCIAL

## 2025-03-08 DIAGNOSIS — I10 ESSENTIAL HYPERTENSION: ICD-10-CM

## 2025-03-08 DIAGNOSIS — Z12.5 PROSTATE CANCER SCREENING: ICD-10-CM

## 2025-03-08 DIAGNOSIS — Z13.1 SCREENING FOR DIABETES MELLITUS: ICD-10-CM

## 2025-03-08 DIAGNOSIS — E78.00 HYPERCHOLESTEROLEMIA: ICD-10-CM

## 2025-03-08 LAB
ALBUMIN SERPL-MCNC: 4.2 G/DL (ref 3.5–5.2)
ALBUMIN/GLOB SERPL: 1.7 {RATIO} (ref 1–2.5)
ALP SERPL-CCNC: 65 U/L (ref 40–129)
ALT SERPL-CCNC: 23 U/L (ref 5–41)
ANION GAP SERPL CALCULATED.3IONS-SCNC: 7 MMOL/L (ref 9–17)
AST SERPL-CCNC: 22 U/L
BASOPHILS # BLD: 0.03 K/UL (ref 0–0.2)
BASOPHILS NFR BLD: 1 % (ref 0–2)
BILIRUB SERPL-MCNC: 0.7 MG/DL (ref 0.3–1.2)
BUN SERPL-MCNC: 22 MG/DL (ref 8–23)
CALCIUM SERPL-MCNC: 9 MG/DL (ref 8.6–10.4)
CHLORIDE SERPL-SCNC: 103 MMOL/L (ref 98–107)
CHOLEST SERPL-MCNC: 153 MG/DL (ref 0–199)
CHOLESTEROL/HDL RATIO: 2.9
CO2 SERPL-SCNC: 28 MMOL/L (ref 20–31)
CREAT SERPL-MCNC: 1.1 MG/DL (ref 0.7–1.2)
EOSINOPHIL # BLD: 0.08 K/UL (ref 0–0.4)
EOSINOPHILS RELATIVE PERCENT: 2 % (ref 0–5)
ERYTHROCYTE [DISTWIDTH] IN BLOOD BY AUTOMATED COUNT: 12.1 % (ref 12.1–15.2)
GFR, ESTIMATED: 77 ML/MIN/1.73M2
GLUCOSE SERPL-MCNC: 120 MG/DL (ref 70–99)
HCT VFR BLD AUTO: 45.1 % (ref 41–53)
HDLC SERPL-MCNC: 52 MG/DL
HGB BLD-MCNC: 15.9 G/DL (ref 13.5–17.5)
IMM GRANULOCYTES # BLD AUTO: 0.01 K/UL (ref 0–0.3)
IMM GRANULOCYTES NFR BLD: 0 % (ref 0–5)
LDLC SERPL CALC-MCNC: 84 MG/DL (ref 0–100)
LYMPHOCYTES NFR BLD: 1.46 K/UL (ref 1–4.8)
LYMPHOCYTES RELATIVE PERCENT: 27 % (ref 13–44)
MCH RBC QN AUTO: 31.1 PG (ref 26–34)
MCHC RBC AUTO-ENTMCNC: 35.3 G/DL (ref 31–37)
MCV RBC AUTO: 88.3 FL (ref 80–100)
MONOCYTES NFR BLD: 0.36 K/UL (ref 0–1)
MONOCYTES NFR BLD: 7 % (ref 5–9)
NEUTROPHILS NFR BLD: 63 % (ref 39–75)
NEUTS SEG NFR BLD: 3.55 K/UL (ref 2.1–6.5)
PLATELET # BLD AUTO: 144 K/UL (ref 140–450)
PMV BLD AUTO: 10 FL (ref 6–12)
POTASSIUM SERPL-SCNC: 4.3 MMOL/L (ref 3.7–5.3)
PROT SERPL-MCNC: 6.7 G/DL (ref 6.4–8.3)
PSA SERPL-MCNC: 0.99 NG/ML (ref 0–4)
RBC # BLD AUTO: 5.11 M/UL (ref 4.5–5.9)
SODIUM SERPL-SCNC: 138 MMOL/L (ref 135–144)
TRIGL SERPL-MCNC: 84 MG/DL
VLDLC SERPL CALC-MCNC: 17 MG/DL (ref 1–30)
WBC OTHER # BLD: 5.5 K/UL (ref 3.5–11)

## 2025-03-08 PROCEDURE — 85025 COMPLETE CBC W/AUTO DIFF WBC: CPT

## 2025-03-08 PROCEDURE — 36415 COLL VENOUS BLD VENIPUNCTURE: CPT

## 2025-03-08 PROCEDURE — G0103 PSA SCREENING: HCPCS

## 2025-03-08 PROCEDURE — 80053 COMPREHEN METABOLIC PANEL: CPT

## 2025-03-08 PROCEDURE — 80061 LIPID PANEL: CPT

## 2025-03-10 ENCOUNTER — RESULTS FOLLOW-UP (OUTPATIENT)
Dept: PRIMARY CARE CLINIC | Age: 60
End: 2025-03-10

## (undated) DEVICE — 4-PORT MANIFOLD: Brand: NEPTUNE 2

## (undated) DEVICE — SYRINGE MED 50ML LUERSLIP TIP

## (undated) DEVICE — Device: Brand: DEFENDO VALVE AND CONNECTOR KIT

## (undated) DEVICE — ENDO KIT W/SYRINGE: Brand: MEDLINE INDUSTRIES, INC.

## (undated) DEVICE — FORCEPS BX L240CM JAW DIA2.4MM ORNG L CAP W/ NDL DISP RAD

## (undated) DEVICE — SOLUTION IRRIG 1000ML STRL H2O USP PLAS POUR BTL

## (undated) DEVICE — HYBRID TUBING WITH CO2 CONNECTION FOR OLYMPUS 140/160/180/190 SERIES GI ENDOSCOPES WITH OLYMPUS AFU-100 , OLYMPUS OFP: Brand: ENDOGATOR HYBRID IRRIGATION TUBING